# Patient Record
Sex: FEMALE | Race: WHITE | NOT HISPANIC OR LATINO | Employment: FULL TIME | URBAN - METROPOLITAN AREA
[De-identification: names, ages, dates, MRNs, and addresses within clinical notes are randomized per-mention and may not be internally consistent; named-entity substitution may affect disease eponyms.]

---

## 2017-02-23 ENCOUNTER — ALLSCRIPTS OFFICE VISIT (OUTPATIENT)
Dept: OTHER | Facility: OTHER | Age: 52
End: 2017-02-23

## 2017-07-06 ENCOUNTER — ALLSCRIPTS OFFICE VISIT (OUTPATIENT)
Dept: OTHER | Facility: OTHER | Age: 52
End: 2017-07-06

## 2017-08-18 ENCOUNTER — ALLSCRIPTS OFFICE VISIT (OUTPATIENT)
Dept: OTHER | Facility: OTHER | Age: 52
End: 2017-08-18

## 2017-08-18 ENCOUNTER — GENERIC CONVERSION - ENCOUNTER (OUTPATIENT)
Dept: OTHER | Facility: OTHER | Age: 52
End: 2017-08-18

## 2017-08-24 ENCOUNTER — GENERIC CONVERSION - ENCOUNTER (OUTPATIENT)
Dept: OTHER | Facility: OTHER | Age: 52
End: 2017-08-24

## 2017-08-24 LAB
ADEQUACY: (HISTORICAL): NORMAL
CLINICIAN PROVIDIED ICD 9 OR 10 (HISTORICAL): NORMAL
COMMENT (HISTORICAL): NORMAL
DIAGNOSIS (HISTORICAL): NORMAL
ELECTRONICALLY SIGNED BY (HISTORICAL): NORMAL
NOTE: (HISTORICAL): NORMAL
PERFORMED BY (HISTORICAL): NORMAL
REFLEX (HISTORICAL): NORMAL
TEST INFORMATION (HISTORICAL): NORMAL

## 2017-12-05 ENCOUNTER — ALLSCRIPTS OFFICE VISIT (OUTPATIENT)
Dept: OTHER | Facility: OTHER | Age: 52
End: 2017-12-05

## 2018-01-10 NOTE — PSYCH
Psych Med Mgmt    Appearance: was calm and cooperative  Observed mood: depressed and anxious  Observed mood: affect was blunted and affect was tearful  Speech: a normal rate  Thought processes: coherent/organized  Hallucinations: no hallucinations present  Thought Content: no delusions  Abnormal Thoughts: The patient has no suicidal thoughts and no homicidal thoughts  Orientation: The patient is oriented to person, place and time  Recent and Remote Memory: short term memory intact and long term memory intact  Attention Span And Concentration: concentration intact  Insight: Insight intact  Judgment: Her judgment was intact  Treatment Recommendations: continue with current medications and schedule appointment with a therapist      She reports normal appetite, normal energy level, no weight change and decrease in number of sleep hours   Patient continues to feel depressed and anxious  Since her last appointment in February, not much has changed in her life  She is still unemployed and struggling to find a job  Patient has been taking money out of her halfway fund to sustain herself, but she states that she is almost out, which has been causing her to feel overwhelmed  Patient's 25year old daughter is living with her and is actively using heroin, which adds to the patient feeling sad and panicked  Her daughter also has a child, which the patient takes a role in helping to raise  Her  passed away from cancer 2 years ago  Patient says that her sleeping pattern is variable, as she wakes up frequently in the middle of the night  She estimates she gets maybe 4-5 hours of fragmented sleep, and does not feel rested in the morning  She continues to have little energy, lack of motivation, and lack of interest       Assessment    1  Bipolar 1 disorder (296 7) (F31 9)   2  ADD (attention deficit disorder) (314 00) (F98 8)    Plan    1   Amphetamine-Dextroamphet ER 30 MG Oral Capsule Extended Release 24   Hour (Adderall XR); take 1 capsule daily   2  BuPROPion HCl ER (XL) 300 MG Oral Tablet Extended Release 24 Hour; 1 po   qam    3  LORazepam 1 MG Oral Tablet; take 1/2-1 tablet every 4 hours if needed   4  OXcarbazepine 600 MG Oral Tablet; 1 po qam  and 1 po qhs    Review of Systems    Constitutional: No fever, no chills, feels well, no tiredness, no recent weight gain or loss  Cardiovascular: no complaints of slow or fast heart rate, no chest pain, no palpitations  Respiratory: no complaints of shortness of breath, no wheezing, no dyspnea on exertion  Gastrointestinal: no complaints of abdominal pain, no constipation, no nausea, no diarrhea, no vomiting  Genitourinary: no complaints of dysuria, no incontinence, no pelvic pain, no urinary frequency  Musculoskeletal: no complaints of arthralgia, no myalgias, no limb pain, no joint stiffness  Integumentary: no complaints of skin rash, no itching, no dry skin  Neurological: no complaints of headache, no confusion, no numbness, no dizziness  Active Problems    1  ADD (attention deficit disorder) (314 00) (F98 8)   2  Bipolar 1 disorder (296 7) (F31 9)   3  Contraceptive use (V25 40) (Z30 40)   4  Encounter for routine gynecological examination with Papanicolaou smear of cervix   (V72 31,V76 2) (Z01 419)   5  Encounter for routine gynecological examination with Papanicolaou smear of cervix   (V72 31,V76 2) (Z01 419)   6  Gastritis (535 50) (K29 70)   7  GERD (gastroesophageal reflux disease) (530 81) (K21 9)   8  Herpes simplex infection (054 9) (B00 9)   9  Visit for screening mammogram (V76 12) (Z12 31)    Past Medical History    1  History of Bipolar disorder (296 80) (F31 9)   2  History of herpes zoster (V12 09) (Z86 19)   3  History of pregnancy (V13 29)    Allergies    1  Morphine Sulfate TABS    Current Meds   1  Amphetamine-Dextroamphet ER 30 MG Oral Capsule Extended Release 24 Hour; take 1   capsule daily;    Therapy: 88FDC7238 to (Evaluate:25Mar2017); Last Rx:23Feb2017 Ordered   2  BuPROPion HCl ER (XL) 300 MG Oral Tablet Extended Release 24 Hour; 1 po qam;   Therapy: 77WPD9070 to (Last Rx:23Nov2016)  Requested for: 23Nov2016 Ordered   3  Cyclobenzaprine HCl - 10 MG Oral Tablet; Therapy: 50Ber6646 to Recorded   4  Dexilant 60 MG Oral Capsule Delayed Release Recorded   5  Diclofenac Sodium 75 MG Oral Tablet Delayed Release; Therapy: 52LJB7828 to Recorded   6  Fluticasone Propionate 50 MCG/ACT Nasal Suspension; Therapy: 53YZX1520 to Recorded   7  Lo Loestrin Fe 1 MG-10 MCG / 10 MCG Oral Tablet; TAKE 1 TABLET DAILY AS   DIRECTED; Therapy: 34Ciq2646 to (Evaluate:17Jul2017)  Requested for: 15Zac1197; Last   Rx:22Jul2016 Ordered   8  LORazepam 1 MG Oral Tablet; take 1/2-1 tablet every 4 hours if needed; Therapy: 37UIF1592 to (Evaluate:98Pbr3948)  Requested for: 30Jun2017; Last   Rx:30Jun2017 Ordered   9  Metoprolol Succinate ER 25 MG Oral Tablet Extended Release 24 Hour; Therapy: 29QTS5247 to Recorded   10  OXcarbazepine 600 MG Oral Tablet; 1 po qam  and 1 po qhs;    Therapy: 47Bzr4571 to (Last Rx:13Apr2017)  Requested for: 13Apr2017 Ordered   11  Pramipexole Dihydrochloride 0 25 MG Oral Tablet; Therapy: 24UJX0440 to Recorded   12  ProAir  (90 Base) MCG/ACT Inhalation Aerosol Solution; Therapy: 84FSV9190 to Recorded   13  ValACYclovir HCl - 500 MG Oral Tablet; take 1 tablet twice a day; Therapy: 93UQX3580 to (Evaluate:71Czc9226)  Requested for: 55YJN7347; Last    Rx:17Feb2017 Ordered   14  Zolpidem Tartrate 10 MG Oral Tablet; TAKE 1/2 TO 1 TABLET AT BEDTIME; Therapy: 48NQI2054 to (Evaluate:14Jun2017)  Requested for: 74OWH2884; Last    Rx:16Mar2017 Ordered    Family Psych History  Mother    1  Family history of Blood disorder  Father    2   Family history of Hypertension    Social History    · Former smoker (D51 31) (A03 082)   · Occasional alcohol use   · Denied: History of Physical abuse   ·     End of Encounter Meds    1  Amphetamine-Dextroamphet ER 30 MG Oral Capsule Extended Release 24 Hour   (Adderall XR); take 1 capsule daily; Therapy: 61HBS5882 to (Evaluate:21Ogr7033); Last Rx:59Yms7251 Ordered   2  BuPROPion HCl ER (XL) 300 MG Oral Tablet Extended Release 24 Hour; 1 po qam;   Therapy: 57CZL0699 to (Last Rx:00Oso7026)  Requested for: 21ACI6963 Ordered    3  LORazepam 1 MG Oral Tablet; take 1/2-1 tablet every 4 hours if needed; Therapy: 68NQJ6419 to (Evaluate:05Aug2017)  Requested for: 01JYE6442; Last   Rx:54Uzr5529 Ordered   4  OXcarbazepine 600 MG Oral Tablet; 1 po qam  and 1 po qhs;   Therapy: 28Uza5065 to (Last Rx:66Eop6254)  Requested for: 82UXI9544 Ordered   5  Zolpidem Tartrate 10 MG Oral Tablet; TAKE 1/2 TO 1 TABLET AT BEDTIME; Therapy: 22RVW0567 to (Evaluate:14Jun2017)  Requested for: 30APH5352; Last   Rx:16Mar2017 Ordered    6  Lo Loestrin Fe 1 MG-10 MCG / 10 MCG Oral Tablet; TAKE 1 TABLET DAILY AS   DIRECTED; Therapy: 65Ftb0729 to (Evaluate:17Jul2017)  Requested for: 97Gxr1060; Last   Rx:12Ovx8920 Ordered    7  ValACYclovir HCl - 500 MG Oral Tablet (Valtrex); take 1 tablet twice a day; Therapy: 17OOH7726 to (Evaluate:82Yta3287)  Requested for: 51IVQ2184; Last   Rx:73Wwu5836 Ordered    8  Cyclobenzaprine HCl - 10 MG Oral Tablet; Therapy: 79Sob5473 to Recorded   9  Dexilant 60 MG Oral Capsule Delayed Release Recorded   10  Diclofenac Sodium 75 MG Oral Tablet Delayed Release; Therapy: 44FXK3178 to Recorded   11  Fluticasone Propionate 50 MCG/ACT Nasal Suspension; Therapy: 64MKW7554 to Recorded   12  Metoprolol Succinate ER 25 MG Oral Tablet Extended Release 24 Hour; Therapy: 20EZN2350 to Recorded   13  Pramipexole Dihydrochloride 0 25 MG Oral Tablet; Therapy: 17RYG4583 to Recorded   14  ProAir  (90 Base) MCG/ACT Inhalation Aerosol Solution;     Therapy: 52TXJ6857 to Recorded    Signatures   Electronically signed by : Flavio Fihs [facility]; Jul 6 2017  5:02PM EST                       (Author)    Electronically signed by : Mora Moody MD; Jul 6 2017  5:07PM EST

## 2018-01-11 NOTE — PSYCH
Psych Med Mgmt    Appearance: was calm and cooperative  Observed mood: was dysphoric, apathetic  and tearful  Observed mood: affect was constricted  Speech: a normal rate  Thought processes: coherent/organized  Hallucinations: no hallucinations present  Thought Content: no delusions  Abnormal Thoughts: The patient has no suicidal thoughts and no homicidal thoughts  Orientation: The patient is oriented to person, place and time  Recent and Remote Memory: short term memory intact and long term memory intact  Attention Span And Concentration: concentration intact  Insight: Insight intact  Judgment: Her judgment was intact  Muscle Strength And Tone  Normal gait and station  Treatment Recommendations: Increase Wellbutrin  mg po qd  Stop Prozac 10 mgpo qd  Trileptal 600 mg po bid  Adderall XR 30 mg po qd **RX GIVEN FOR 30 DAYS WITH 2 REFILLS***   Lorazepam 1 mg prn  Ambien 10 mg po qhs prn  Risks, Benefits And Possible Side Effects Of Medications: Risks, benefits, and possible side effects of medications explained to patient and patient verbalizes understanding  She reports normal appetite, normal energy level, no weight change and normal number of sleep hours  pt seen for follow up depression  She is feeling a little better  Her daughter had a baby boy  She is scheduled to have a sleep study the end of September  She had sleep study at home but going inpatient for one  Vitals  Signs   Recorded: 35Jlg8459 09:55AM   Respiration: 16  Height: 5 ft 5 5 in  Weight: 160 lb   BMI Calculated: 26 22  BSA Calculated: 1 81    DSM    Provisional Diagnosis: Bipolar 1  ADD  Assessment    1  Bipolar 1 disorder (296 7) (F31 9)   2  ADD (attention deficit disorder) (314 00) (F90 0)    Plan    1  Amphetamine-Dextroamphet ER 30 MG Oral Capsule Extended Release 24   Hour (Adderall XR); TAKE 1 CAPSULE DAILY   2   BuPROPion HCl ER (XL) 300 MG Oral Tablet Extended Release 24 Hour; 1 po   qam 3  LORazepam 1 MG Oral Tablet; 1/2-1 po q 4 hours prn   4  OXcarbazepine 600 MG Oral Tablet; 1 po qam  and 1 po qhs   5  Zolpidem Tartrate 10 MG Oral Tablet; take 1 tablet at  bedtime as needed for   insomnia    Review of Systems    Constitutional: No fever, no chills, feels well, no tiredness, no recent weight gain or loss  Cardiovascular: no complaints of slow or fast heart rate, no chest pain, no palpitations  Respiratory: no complaints of shortness of breath, no wheezing, no dyspnea on exertion  Gastrointestinal: no complaints of abdominal pain, no constipation, no nausea, no diarrhea, no vomiting  Genitourinary: no complaints of dysuria, no incontinence, no pelvic pain, no urinary frequency  Musculoskeletal: CHRONIC BACK PAIN, but no complaints of arthralgia, no myalgias, no limb pain, no joint stiffness  Integumentary: no complaints of skin rash, no itching, no dry skin  Active Problems    1  ADD (attention deficit disorder) (314 00) (F90 0)   2  Bipolar 1 disorder (296 7) (F31 9)   3  Contraceptive use (V25 40) (Z30 40)   4  Encounter for routine gynecological examination with Papanicolaou smear of cervix   (V72 31,V76 2) (Z01 419)   5  Encounter for routine gynecological examination with Papanicolaou smear of cervix   (V72 31,V76 2) (Z01 419)   6  Gastritis (535 50) (K29 70)   7  GERD (gastroesophageal reflux disease) (530 81) (K21 9)   8  Herpes simplex infection (054 9) (B00 9)   9  Visit for screening mammogram (V76 12) (Z12 31)    Past Medical History    1  History of Bipolar disorder (296 80) (F31 9)   2  History of herpes zoster (V12 09) (Z86 19)   3  History of pregnancy (V13 29)    Allergies    1  Morphine Sulfate TABS    Current Meds   1  Amphetamine-Dextroamphet ER 30 MG Oral Capsule Extended Release 24 Hour; TAKE   1 CAPSULE DAILY; Therapy: 25RHH1516 to (Evaluate:00Vtd8005); Last Rx:58Vah8989 Ordered   2   BuPROPion HCl ER (XL) 150 MG Oral Tablet Extended Release 24 Hour; 1 po qam;   Therapy: 30GVS0341 to (Last Rx:77Fdg3241)  Requested for: 30EEP9851 Ordered   3  Cyclobenzaprine HCl - 10 MG Oral Tablet; Therapy: 85Wcq2740 to Recorded   4  Dexilant 60 MG Oral Capsule Delayed Release Recorded   5  Diclofenac Sodium 75 MG Oral Tablet Delayed Release; Therapy: 88IJA0429 to Recorded   6  Lo Loestrin Fe 1 MG-10 MCG / 10 MCG Oral Tablet; TAKE 1 TABLET DAILY AS   DIRECTED; Therapy: 87Syc0922 to (Evaluate:98Mwf8862)  Requested for: 17Yil8167; Last   Rx:90Raf7474 Ordered   7  LORazepam 1 MG Oral Tablet; 1/2-1 po q 4 hours prn; Therapy: 41ITO5694 to (Last Rx:06Xfp4842) Ordered   8  Metoprolol Succinate ER 25 MG Oral Tablet Extended Release 24 Hour; Therapy: 27THQ3952 to Recorded   9  Ondansetron 4 MG Oral Tablet Dispersible; Therapy: 12FDD0361 to Recorded   10  OXcarbazepine 600 MG Oral Tablet; 1 po qhs;    Therapy: 12Ocw6734 to (Evaluate:71Xqa9018)  Requested for: 75GWJ5847; Last    Rx:32Ucm0292 Ordered   11  ProAir  (90 Base) MCG/ACT Inhalation Aerosol Solution; Therapy: 20ISD8480 to Recorded   12  ValACYclovir HCl - 500 MG Oral Tablet; Take 1 tablet twice daily; Therapy: 36BKJ2493 to (Evaluate:28Jan2017)  Requested for: 32TNG4637; Last    Rx:68Dtr5520 Ordered   13  Zolpidem Tartrate 10 MG Oral Tablet; take 1 tablet at  bedtime as needed for insomnia; Therapy: 72PSM8067 to (Evaluate:58Ams9437)  Requested for: 60AEM1489; Last    Rx:46Bnu9828 Ordered    The medication list was reviewed and updated today  Family Psych History  Mother    1  Family history of Blood disorder  Father    2  Family history of Hypertension    The family history was reviewed and updated today  Social History    · Former smoker (M49 22) (T33 949)   · Occasional alcohol use   · Denied: History of Physical abuse   ·   The social history was reviewed and updated today  The social history was reviewed and is unchanged  End of Encounter Meds    1   Amphetamine-Dextroamphet ER 30 MG Oral Capsule Extended Release 24 Hour   (Adderall XR); TAKE 1 CAPSULE DAILY; Therapy: 53EJW4214 to (Evaluate:26Nof1328); Last Rx:85Qfx3251 Ordered   2  BuPROPion HCl ER (XL) 300 MG Oral Tablet Extended Release 24 Hour; 1 po qam;   Therapy: 95PEN5590 to (Last Rx:07Lic5769)  Requested for: 59Uvc1576 Ordered    3  LORazepam 1 MG Oral Tablet; 1/2-1 po q 4 hours prn; Therapy: 62IGR7597 to (Last Rx:91Wcs6628) Ordered   4  OXcarbazepine 600 MG Oral Tablet; 1 po qam  and 1 po qhs;   Therapy: 59Dpg4718 to (Last Rx:07Hly5472)  Requested for: 54Ymd9289 Ordered   5  Zolpidem Tartrate 10 MG Oral Tablet; take 1 tablet at  bedtime as needed for insomnia; Therapy: 50AZS8205 to (Evaluate:14Oct2016)  Requested for: 35Nmj4339; Last   Rx:90Mrn8869 Ordered    6  Lo Loestrin Fe 1 MG-10 MCG / 10 MCG Oral Tablet; TAKE 1 TABLET DAILY AS   DIRECTED; Therapy: 24Btu0412 to (Evaluate:78Kfe5555)  Requested for: 02Dnb9467; Last   Rx:28Mfp7048 Ordered    7  ValACYclovir HCl - 500 MG Oral Tablet (Valtrex); Take 1 tablet twice daily; Therapy: 99CKQ1248 to (Evaluate:28Jan2017)  Requested for: 82ZZG9374; Last   Rx:71Wid8861 Ordered    8  Cyclobenzaprine HCl - 10 MG Oral Tablet; Therapy: 55Pbp2225 to Recorded   9  Dexilant 60 MG Oral Capsule Delayed Release Recorded   10  Diclofenac Sodium 75 MG Oral Tablet Delayed Release; Therapy: 00QZR2979 to Recorded   11  Metoprolol Succinate ER 25 MG Oral Tablet Extended Release 24 Hour; Therapy: 62LJU3413 to Recorded    Future Appointments    Date/Time Provider Specialty Site   11/23/2016 09:30 AM Doni Bl[facility]     Signatures   Electronically signed by : Tessie Bailey [facility]; Aug 15 2016  9:50AM EST                       (Author)    Electronically signed by : Tessie Bailey [facility];  Aug 15 2016  9:54AM EST                       (Author)    Electronically signed by : Miguel Rosales MD; Aug 15 2016  4:01PM EST

## 2018-01-13 NOTE — PSYCH
Psych Med Mgmt    Appearance: was calm and cooperative  Observed mood: mood appropriate  Observed mood: affect appropriate  Speech: a normal rate  Thought processes: coherent/organized  Hallucinations: no hallucinations present  Thought Content: no delusions  Abnormal Thoughts: The patient has no suicidal thoughts and no homicidal thoughts  Orientation: The patient is oriented to person, place and time  Recent and Remote Memory: short term memory intact and long term memory intact  Attention Span And Concentration: concentration intact  Insight: Insight intact  Judgment: Her judgment was intact  Muscle Strength And Tone  Normal gait and station  Treatment Recommendations: Wellbutrin  m po q am  Adderall XR 30 mg po qam  Trileptal 600 mg po bid  Ambien 10 mgo qhs prn  Ativan 1 mgpo q 4 hours  Trial Trazodone 50 mgpo qhs  Risks, Benefits And Possible Side Effects Of Medications: Risks, benefits, and possible side effects of medications explained to patient and patient verbalizes understanding  Discussed with patient the risks of sedation, respiratory depression, impairment of ability to drive and potential for abuse and addiction related to treatment with benzodiazepine medications  The patient understands risk of treatment with benzodiazepine medications, agrees to not drive if feels impaired and agrees to take medications as prescribed  She reports normal appetite, normal energy level, no weight change and normal number of sleep hours  Pt seen for follow up ADD/ Bipolar Disorder  Pt states she has continues to struggle with depression/ motivation  Her daughter is going to rehab tomorrow- this will be her 4th time in rehab  She is caring for the 2 month old baby  She did have sleep study and was diagnosed with restless syndrome and started on requip, nasal spray and was told to use a mouth guard  Pt overall appears at her baseline   She has not been having any side effects with meds  She continues with difficulty with sleep  Appetite is good  Vitals  Signs   Recorded: 23Nov2016 09:59AM   Heart Rate: 83  Respiration: 16  Systolic: 898  Diastolic: 98  Height: 5 ft 5 5 in  Weight: 159 lb   BMI Calculated: 26 06  BSA Calculated: 1 8    DSM    Provisional Diagnosis: ADD  Bipolar DIsorder  Assessment    1  ADD (attention deficit disorder) (314 00) (F98 8)   2  Bipolar 1 disorder (296 7) (F31 9)    Plan    1  TraZODone HCl - 50 MG Oral Tablet; 1 -2 po qhs prn   2  Amphetamine-Dextroamphet ER 30 MG Oral Capsule Extended Release 24   Hour (Adderall XR); take 1 capsule daily   3  BuPROPion HCl ER (XL) 300 MG Oral Tablet Extended Release 24 Hour; 1 po   qam    4  OXcarbazepine 600 MG Oral Tablet; 1 po qam  and 1 po qhs    5  Ondansetron 4 MG Oral Tablet Dispersible    Review of Systems    Constitutional: feeling tired  Cardiovascular: no complaints of slow or fast heart rate, no chest pain, no palpitations  Respiratory: no complaints of shortness of breath, no wheezing, no dyspnea on exertion  Gastrointestinal: no complaints of abdominal pain, no constipation, no nausea, no diarrhea, no vomiting  Genitourinary: no complaints of dysuria, no incontinence, no pelvic pain, no urinary frequency  Musculoskeletal: no complaints of arthralgia, no myalgias, no limb pain, no joint stiffness  Integumentary: no complaints of skin rash, no itching, no dry skin  Neurological: no complaints of headache, no confusion, no numbness, no dizziness  Active Problems    1  ADD (attention deficit disorder) (314 00) (F98 8)   2  Bipolar 1 disorder (296 7) (F31 9)   3  Contraceptive use (V25 40) (Z30 40)   4  Encounter for routine gynecological examination with Papanicolaou smear of cervix   (V72 31,V76 2) (Z01 419)   5  Encounter for routine gynecological examination with Papanicolaou smear of cervix   (V72 31,V76 2) (Z01 419)   6  Gastritis (535 50) (K29 70)   7  GERD (gastroesophageal reflux disease) (530 81) (K21 9)   8  Herpes simplex infection (054 9) (B00 9)   9  Visit for screening mammogram (V76 12) (Z12 31)    Past Medical History    1  History of Bipolar disorder (296 80) (F31 9)   2  History of herpes zoster (V12 09) (Z86 19)   3  History of pregnancy (V13 29)    Allergies    1  Morphine Sulfate TABS    Current Meds   1  Amphetamine-Dextroamphet ER 30 MG Oral Capsule Extended Release 24 Hour; TAKE   1 CAPSULE DAILY; Therapy: 21QBW2196 to (Evaluate:98Oro6094); Last Rx:63Xnw1758 Ordered   2  BuPROPion HCl ER (XL) 300 MG Oral Tablet Extended Release 24 Hour; 1 po qam;   Therapy: 23JXA9449 to (Last Rx:41Qhz5754)  Requested for: 36Djm2156 Ordered   3  Cyclobenzaprine HCl - 10 MG Oral Tablet; Therapy: 62Xls5944 to Recorded   4  Dexilant 60 MG Oral Capsule Delayed Release Recorded   5  Diclofenac Sodium 75 MG Oral Tablet Delayed Release; Therapy: 46CLZ2252 to Recorded   6  Fluticasone Propionate 50 MCG/ACT Nasal Suspension; Therapy: 03SDQ0145 to Recorded   7  Lo Loestrin Fe 1 MG-10 MCG / 10 MCG Oral Tablet; TAKE 1 TABLET DAILY AS   DIRECTED; Therapy: 38Asf4871 to (Evaluate:00Pja6632)  Requested for: 92Twk1750; Last   Rx:76Wya7092 Ordered   8  LORazepam 1 MG Oral Tablet; 1/2-1 po q 4 hours prn; Therapy: 66HOX7800 to (Last Rx:69Rat3769)  Requested for: 18Eao6943; Status:   ACTIVE - Renewal Denied Ordered   9  Metoprolol Succinate ER 25 MG Oral Tablet Extended Release 24 Hour; Therapy: 03FMV8745 to Recorded   10  Ondansetron 4 MG Oral Tablet Dispersible; Therapy: 65QTC3164 to Recorded   11  OXcarbazepine 600 MG Oral Tablet; 1 po qam  and 1 po qhs;    Therapy: 01Ogt8986 to (Last Rx:17Hpv0244)  Requested for: 45Qld3001 Ordered   12  Pramipexole Dihydrochloride 0 25 MG Oral Tablet; Therapy: 38LQD5360 to Recorded   13  ProAir  (90 Base) MCG/ACT Inhalation Aerosol Solution; Therapy: 84JRP5985 to Recorded   14   ValACYclovir HCl - 500 MG Oral Tablet; Take 1 tablet twice daily; Therapy: 66UOJ8548 to (Evaluate:28Jan2017)  Requested for: 85SYW5969; Last    Rx:54Pkr0855 Ordered   15  Zolpidem Tartrate 10 MG Oral Tablet; take 1 tablet by mouth at bedtime for sleep; Therapy: 66WRL7832 to (Evaluate:28Owf6293)  Requested for: 15TCZ6545; Last    Rx:16Nov2016 Ordered    The medication list was reviewed and updated today  Family Psych History  Mother    1  Family history of Blood disorder  Father    2  Family history of Hypertension    The family history was reviewed and updated today  Social History    · Former smoker (Y61 87) (K14 564)   · Occasional alcohol use   · Denied: History of Physical abuse   ·   The social history was reviewed and updated today  The social history was reviewed and is unchanged  End of Encounter Meds    1  Amphetamine-Dextroamphet ER 30 MG Oral Capsule Extended Release 24 Hour   (Adderall XR); take 1 capsule daily; Therapy: 35BVC3690 to (Evaluate:23Dec2016); Last Rx:23Nov2016 Ordered   2  BuPROPion HCl ER (XL) 300 MG Oral Tablet Extended Release 24 Hour; 1 po qam;   Therapy: 80TNW1462 to (Last Rx:23Nov2016)  Requested for: 23Nov2016 Ordered   3  TraZODone HCl - 50 MG Oral Tablet; 1 -2 po qhs prn; Therapy: 91CQF8864 to (Last Rx:23Nov2016)  Requested for: 23Nov2016 Ordered    4  LORazepam 1 MG Oral Tablet; 1/2-1 po q 4 hours prn; Therapy: 48TEI4602 to (Last Rx:40Rbg4831)  Requested for: 96Bdz0075; Status:   ACTIVE - Renewal Denied Ordered   5  OXcarbazepine 600 MG Oral Tablet; 1 po qam  and 1 po qhs;   Therapy: 52Fyc8018 to (Last Rx:23Nov2016)  Requested for: 23Nov2016 Ordered   6  Zolpidem Tartrate 10 MG Oral Tablet; take 1 tablet by mouth at bedtime for sleep; Therapy: 16AIK8381 to (Evaluate:79Tuw3610)  Requested for: 41FIS6548; Last   Rx:16Nov2016 Ordered    7  Lo Loestrin Fe 1 MG-10 MCG / 10 MCG Oral Tablet; TAKE 1 TABLET DAILY AS   DIRECTED;    Therapy: 90Pok7046 to (Evaluate:23Yxd1397)  Requested for: 27YEV2755; Last   Rx:05Puv2930 Ordered    8  ValACYclovir HCl - 500 MG Oral Tablet (Valtrex); Take 1 tablet twice daily; Therapy: 53WHP2153 to (Evaluate:28Jan2017)  Requested for: 14UPD8147; Last   Rx:31Ird0288 Ordered    9  Cyclobenzaprine HCl - 10 MG Oral Tablet; Therapy: 48Scm3479 to Recorded   10  Dexilant 60 MG Oral Capsule Delayed Release Recorded   11  Diclofenac Sodium 75 MG Oral Tablet Delayed Release; Therapy: 43ZFS5183 to Recorded   12  Fluticasone Propionate 50 MCG/ACT Nasal Suspension; Therapy: 02QNC0736 to Recorded   13  Metoprolol Succinate ER 25 MG Oral Tablet Extended Release 24 Hour; Therapy: 02PII5358 to Recorded   14  Pramipexole Dihydrochloride 0 25 MG Oral Tablet; Therapy: 29FQH7551 to Recorded   15  ProAir  (90 Base) MCG/ACT Inhalation Aerosol Solution;     Therapy: 38KLK3721 to Recorded    Signatures   Electronically signed by : Emily Barber [facility]; Nov 23 2016 10:11AM EST                       (Author)    Electronically signed by : Aranza Bay MD; Nov 23 2016  3:38PM EST

## 2018-01-14 VITALS
SYSTOLIC BLOOD PRESSURE: 120 MMHG | DIASTOLIC BLOOD PRESSURE: 78 MMHG | HEIGHT: 65 IN | WEIGHT: 164.38 LBS | BODY MASS INDEX: 27.39 KG/M2

## 2018-01-16 NOTE — PSYCH
Psych Med Mgmt    Appearance: was calm and cooperative  Observed mood: was dysphoric, depressed and anxious  Observed mood: affect was constricted and affect was tearful  Speech: a normal rate  Thought processes: coherent/organized  Hallucinations: no hallucinations present  Thought Content: no delusions  Abnormal Thoughts: The patient has no suicidal thoughts and no homicidal thoughts  Orientation: The patient is oriented to person, place and time  Recent and Remote Memory: short term memory intact and long term memory intact  Attention Span And Concentration: concentration intact  Insight: Insight intact  Judgment: Her judgment was intact  Muscle Strength And Tone  Normal gait and station  Treatment Recommendations: Wellbutrin  mg po qam  Trial Vraylar 1 5 mg po qd  Ambien 10 mgpo qhs- does not take every night  Trileptal 600 mgpo bid  Adderall XR 30 mgpo qam  Ativan prn- not taking often    She has had multiple med trials in past    Risks, Benefits And Possible Side Effects Of Medications: Risks, benefits, and possible side effects of medications explained to patient and patient verbalizes understanding  She reports normal appetite, normal energy level, no weight change and normal number of sleep hours  Pt seen for follow up Bipolar Disorder/ ADD  Pt continues with residual stressors/ depression- poor motivation and "not doing anything"  SHe states she is not working and is looking for work but has not found anythng- worked in [address]  She states she is hart at times  No suicidal thoughts  Poor motivation- lack of interest  Sleep variable- states trazodone was "too strong"  No new medical issues  Her daughter is out of rehab x one week- baby 7 months old now,        Vitals  Signs   Recorded: 45Uym8407 10:06AM   Heart Rate: 74  Systolic: 544  Diastolic: 91  Recorded: 14RTD0869 10:00AM   Respiration: 16  Height: 5 ft 5 5 in  Weight: 162 lb   BMI Calculated: 26 55  BSA Calculated: 1 82    DSM    Provisional Diagnosis: ADD  Bipolar Disorder  Assessment    1  ADD (attention deficit disorder) (314 00) (F98 8)   2  Bipolar 1 disorder (296 7) (F31 9)    Plan    1  Amphetamine-Dextroamphet ER 30 MG Oral Capsule Extended Release 24   Hour (Adderall XR); take 1 capsule daily    2  Vraylar 1 5 MG Oral Capsule; 1 PO QD    Review of Systems    Constitutional: No fever, no chills, feels well, no tiredness, no recent weight gain or loss  Cardiovascular: no complaints of slow or fast heart rate, no chest pain, no palpitations  Respiratory: no complaints of shortness of breath, no wheezing, no dyspnea on exertion  Gastrointestinal: no complaints of abdominal pain, no constipation, no nausea, no diarrhea, no vomiting  Genitourinary: no complaints of dysuria, no incontinence, no pelvic pain, no urinary frequency  Musculoskeletal: no complaints of arthralgia, no myalgias, no limb pain, no joint stiffness  Integumentary: no complaints of skin rash, no itching, no dry skin  Neurological: no complaints of headache, no confusion, no numbness, no dizziness  Active Problems    1  ADD (attention deficit disorder) (314 00) (F98 8)   2  Bipolar 1 disorder (296 7) (F31 9)   3  Contraceptive use (V25 40) (Z30 40)   4  Encounter for routine gynecological examination with Papanicolaou smear of cervix   (V72 31,V76 2) (Z01 419)   5  Encounter for routine gynecological examination with Papanicolaou smear of cervix   (V72 31,V76 2) (Z01 419)   6  Gastritis (535 50) (K29 70)   7  GERD (gastroesophageal reflux disease) (530 81) (K21 9)   8  Herpes simplex infection (054 9) (B00 9)   9  Visit for screening mammogram (V76 12) (Z12 31)    Past Medical History    1  History of Bipolar disorder (296 80) (F31 9)   2  History of herpes zoster (V12 09) (Z86 19)   3  History of pregnancy (V13 29)    Allergies    1  Morphine Sulfate TABS    Current Meds   1   Amphetamine-Dextroamphet ER 30 MG Oral Capsule Extended Release 24 Hour; take 1   capsule daily; Therapy: 69ZCZ6682 to (Evaluate:25Hvg4324); Last Rx:23Nov2016 Ordered   2  BuPROPion HCl ER (XL) 300 MG Oral Tablet Extended Release 24 Hour; 1 po qam;   Therapy: 03QOD8392 to (Last Rx:23Nov2016)  Requested for: 23Nov2016 Ordered   3  Cyclobenzaprine HCl - 10 MG Oral Tablet; Therapy: 78Gdi9707 to Recorded   4  Dexilant 60 MG Oral Capsule Delayed Release Recorded   5  Diclofenac Sodium 75 MG Oral Tablet Delayed Release; Therapy: 38ZUO5388 to Recorded   6  Fluticasone Propionate 50 MCG/ACT Nasal Suspension; Therapy: 64SEL5464 to Recorded   7  Lo Loestrin Fe 1 MG-10 MCG / 10 MCG Oral Tablet; TAKE 1 TABLET DAILY AS   DIRECTED; Therapy: 78Xel0800 to (Evaluate:87Qiy7425)  Requested for: 34Fct6619; Last   Rx:10Drd5051 Ordered   8  LORazepam 1 MG Oral Tablet; take 1/2-1 tablet every 4 hours if needed; Therapy: 16ONL2337 to (Evaluate:10Coh4676)  Requested for: 70DCD5670; Last   Rx:11Jan2017 Ordered   9  Metoprolol Succinate ER 25 MG Oral Tablet Extended Release 24 Hour; Therapy: 10FKF8602 to Recorded   10  OXcarbazepine 600 MG Oral Tablet; 1 po qam  and 1 po qhs;    Therapy: 17Flc3386 to (Last Rx:23Nov2016)  Requested for: 23Nov2016 Ordered   11  Pramipexole Dihydrochloride 0 25 MG Oral Tablet; Therapy: 34TKY3478 to Recorded   12  ProAir  (90 Base) MCG/ACT Inhalation Aerosol Solution; Therapy: 66TDW0871 to Recorded   13  ValACYclovir HCl - 500 MG Oral Tablet; take 1 tablet twice a day; Therapy: 05CFZ2577 to (Evaluate:15Fxm1445)  Requested for: 03GHU0594; Last    Rx:17Feb2017 Ordered   14  Zolpidem Tartrate 10 MG Oral Tablet; take 1 tablet by mouth at bedtime for sleep; Therapy: 68FCH4028 to (Evaluate:11Ulx8807)  Requested for: 82JDH9324; Last    Rx:11Jan2017; Status: ACTIVE - Renewal Denied Ordered    The medication list was reviewed and updated today  Family Psych History  Mother    1   Family history of Blood disorder  Father    2  Family history of Hypertension    Social History    · Former smoker (C91 74) (Y83 293)   · Occasional alcohol use   · Denied: History of Physical abuse   ·     End of Encounter Meds    1  Amphetamine-Dextroamphet ER 30 MG Oral Capsule Extended Release 24 Hour   (Adderall XR); take 1 capsule daily; Therapy: 24TRF5029 to (Evaluate:25Mar2017); Last Rx:23Feb2017 Ordered   2  BuPROPion HCl ER (XL) 300 MG Oral Tablet Extended Release 24 Hour; 1 po qam;   Therapy: 07WLY7873 to (Last Rx:23Nov2016)  Requested for: 23Nov2016 Ordered    3  LORazepam 1 MG Oral Tablet; take 1/2-1 tablet every 4 hours if needed; Therapy: 10ESR0149 to (Evaluate:10Feb2017)  Requested for: 13RLT6208; Last   Rx:11Jan2017 Ordered   4  OXcarbazepine 600 MG Oral Tablet; 1 po qam  and 1 po qhs;   Therapy: 42Xlo3355 to (Last Rx:23Nov2016)  Requested for: 23Nov2016 Ordered   5  Vraylar 1 5 MG Oral Capsule; 1 PO QD; Therapy: 11HOA1595 to (Last Rx:23Feb2017) Ordered   6  Zolpidem Tartrate 10 MG Oral Tablet; take 1 tablet by mouth at bedtime for sleep; Therapy: 12OWF2971 to (Evaluate:10Feb2017)  Requested for: 66KQF0984; Last   Rx:11Jan2017; Status: ACTIVE - Renewal Denied Ordered    7  Lo Loestrin Fe 1 MG-10 MCG / 10 MCG Oral Tablet; TAKE 1 TABLET DAILY AS   DIRECTED; Therapy: 29Zhy4505 to (Evaluate:39Cxa3431)  Requested for: 46Lga3964; Last   Rx:04Kmp3338 Ordered    8  ValACYclovir HCl - 500 MG Oral Tablet (Valtrex); take 1 tablet twice a day; Therapy: 36YXQ4282 to (Evaluate:30Oum7055)  Requested for: 53LBN6553; Last   Rx:17Feb2017 Ordered    9  Cyclobenzaprine HCl - 10 MG Oral Tablet; Therapy: 61Fyd8131 to Recorded   10  Dexilant 60 MG Oral Capsule Delayed Release Recorded   11  Diclofenac Sodium 75 MG Oral Tablet Delayed Release; Therapy: 27CUS1471 to Recorded   12  Fluticasone Propionate 50 MCG/ACT Nasal Suspension; Therapy: 57FMR9375 to Recorded   13   Metoprolol Succinate ER 25 MG Oral Tablet Extended Release 24 Hour; Therapy: 49OUF8763 to Recorded   14  Pramipexole Dihydrochloride 0 25 MG Oral Tablet; Therapy: 01XEC7835 to Recorded   15  ProAir  (90 Base) MCG/ACT Inhalation Aerosol Solution;     Therapy: 05AMO6045 to Recorded    Signatures   Electronically signed by : Leah Johnson, [facility]; Feb 23 2017 10:10AM EST                       (Author)    Electronically signed by : Miriam Villegas MD; Feb 27 2017  4:26PM EST

## 2018-01-17 NOTE — PSYCH
Psych Med Mgmt    Observed mood: was dysphoric and depressed  Observed mood: affect was tearful  Speech: speech soft  Thought processes: coherent/organized  Hallucinations: no hallucinations present  Thought Content: no delusions  Abnormal Thoughts: The patient has no suicidal thoughts and no homicidal thoughts  Orientation: The patient is oriented to person, place and time  Recent and Remote Memory: short term memory intact and long term memory intact  Attention Span And Concentration: concentration intact  Insight: Insight intact  Judgment: Her judgment was intact  Muscle Strength And Tone  Normal gait and station  Treatment Recommendations: Adderal XR 30 mgpo qd **RX GIVEN FOR 30 DAYS WITH 1 REFILL***  Increase Trileptal 300 mg 1 po qam and 2 po qhs  Decrease Prozac to 10 mg po qd  Wellbutrin  mg po qam  Ambien 10 mg po qhs prn  Ativan 1mg po q4 hours prn  Risks, Benefits And Possible Side Effects Of Medications: Risks, benefits, and possible side effects of medications explained to patient and patient verbalizes understanding  She reports normal appetite, normal energy level, no weight change and normal number of sleep hours  Pt seen for follow up Bipolar Disorder/ADD  Pt did not feel "good" with Cymbalta - she took for 2 months and stopped and went back to Prozac  She continues with crying spells/ tearfulness  She feels tired often- no anxiety or panic attacks  No motivation or interest  No SI       DSM    Provisional Diagnosis: ADD  Bipolar Disorder  Assessment    1  ADD (attention deficit disorder) (314 00) (F90 0)   2  Bipolar 1 disorder (296 7) (F31 9)    Plan    1  Amphetamine-Dextroamphet ER 30 MG Oral Capsule Extended Release 24 Hour   (Adderall XR); TAKE 1 CAPSULE DAILY   2  BuPROPion HCl ER (XL) 150 MG Oral Tablet Extended Release 24 Hour   (Wellbutrin XL); 1 po qam   3  FLUoxetine HCl - 10 MG Oral Tablet; 1 PO QD    4   LORazepam 1 MG Oral Tablet; 1/2-1 po q 4 hours prn   5  OXcarbazepine 300 MG Oral Tablet; 1 tab po qam and 2 po qhs   6  Zolpidem Tartrate 10 MG Oral Tablet; take 1 tablet at  bedtime as needed for   insomnia    Review of Systems    Constitutional: No fever, no chills, feels well, no tiredness, no recent weight gain or loss  Cardiovascular: no complaints of slow or fast heart rate, no chest pain, no palpitations  Respiratory: no complaints of shortness of breath, no wheezing, no dyspnea on exertion  Gastrointestinal: no complaints of abdominal pain, no constipation, no nausea, no diarrhea, no vomiting  Genitourinary: no complaints of dysuria, no incontinence, no pelvic pain, no urinary frequency  Musculoskeletal: back/ neck pain  Integumentary: no complaints of skin rash, no itching, no dry skin  Neurological: headache  Active Problems    1  ADD (attention deficit disorder) (314 00) (F90 0)   2  Bipolar 1 disorder (296 7) (F31 9)   3  Encounter for routine gynecological examination with Papanicolaou smear of cervix   (V72 31,V76 2) (Z01 419)   4  Encounter for routine gynecological examination with Papanicolaou smear of cervix   (V72 31,V76 2) (Z01 419)   5  Gastritis (535 50) (K29 70)   6  GERD (gastroesophageal reflux disease) (530 81) (K21 9)   7  Herpes simplex infection (054 9) (B00 9)   8  Visit for screening mammogram (V76 12) (Z12 31)    Past Medical History    1  History of Bipolar disorder (296 80) (F31 9)   2  History of herpes zoster (V12 09) (Z86 19)   3  History of pregnancy (V13 29)    Allergies    1  Morphine Sulfate TABS    Current Meds   1  Cyclobenzaprine HCl - 10 MG Oral Tablet; Therapy: 77Ueu1233 to Recorded   2  Dexilant 60 MG Oral Capsule Delayed Release Recorded   3  Diclofenac Sodium 75 MG Oral Tablet Delayed Release; Therapy: 53YWK0358 to Recorded   4  LORazepam 1 MG Oral Tablet; 1/2-1 po q 4 hours prn; Therapy: 99OXZ3885 to (Last Rx:11Mar2016) Ordered   5   Metoprolol Succinate ER 25 MG Oral Tablet Extended Release 24 Hour; Therapy: 15LHR0027 to Recorded   6  OXcarbazepine 300 MG Oral Tablet; 1 tab po bid; Therapy: 41Rox1417 to (Evaluate:09Jun2016)  Requested for: 30XQI6941; Last   Rx:11Mar2016 Ordered   7  ValACYclovir HCl - 500 MG Oral Tablet; Take 1 tablet twice daily; Therapy: 08YZA6111 to (Evaluate:28Jan2017)  Requested for: 49CAW1123; Last   Rx:78Pny8706 Ordered   8  Zolpidem Tartrate 10 MG Oral Tablet; take 1 tablet at  bedtime as needed for insomnia; Therapy: 86ZAF8329 to (Evaluate:10Iug2970)  Requested for: 20Jun2016; Last   Rx:20Jun2016 Ordered    The medication list was reviewed and updated today  Family Psych History  Mother    1  Family history of Blood disorder  Father    2  Family history of Hypertension    The family history was reviewed and updated today  Social History    · Former smoker (H71 64) (M86 576)   · Occasional alcohol use   · Denied: History of Physical abuse   ·   The social history was reviewed and updated today  The social history was reviewed and is unchanged  End of Encounter Meds    1  Amphetamine-Dextroamphet ER 30 MG Oral Capsule Extended Release 24 Hour   (Adderall XR); TAKE 1 CAPSULE DAILY; Therapy: 78VWN6291 to (Evaluate:72Szg7315); Last Rx:47Mcy2405 Ordered   2  BuPROPion HCl ER (XL) 150 MG Oral Tablet Extended Release 24 Hour (Wellbutrin XL);   1 po qam;   Therapy: 69OWY0038 to (Last Rx:74Nqf4174)  Requested for: 17IJD8533 Ordered   3  FLUoxetine HCl - 10 MG Oral Tablet; 1 PO QD; Therapy: 79LUA1201 to (Last Rx:10Ack8453)  Requested for: 95EEW9212 Ordered    4  LORazepam 1 MG Oral Tablet; 1/2-1 po q 4 hours prn; Therapy: 67XOB1297 to (Last Rx:71Xuw4211) Ordered   5  OXcarbazepine 300 MG Oral Tablet; 1 tab po qam and 2 po qhs;   Therapy: 37Pff2501 to (Evaluate:59Xcu2666)  Requested for: 49AGT8439; Last   Rx:63Bic1893 Ordered   6  Zolpidem Tartrate 10 MG Oral Tablet; take 1 tablet at  bedtime as needed for insomnia;    Therapy: 96PCX9829 to (Evaluate:57Tda1805)  Requested for: 38ZEO2033; Last   Rx:65Mrx5497 Ordered    7  ValACYclovir HCl - 500 MG Oral Tablet (Valtrex); Take 1 tablet twice daily; Therapy: 58RTH9239 to (Evaluate:28Jan2017)  Requested for: 89SAG9346; Last   Rx:43Smx4725 Ordered    8  Cyclobenzaprine HCl - 10 MG Oral Tablet; Therapy: 26Ynw7869 to Recorded   9  Dexilant 60 MG Oral Capsule Delayed Release Recorded   10  Diclofenac Sodium 75 MG Oral Tablet Delayed Release; Therapy: 29JNW9401 to Recorded   11  Metoprolol Succinate ER 25 MG Oral Tablet Extended Release 24 Hour;     Therapy: 24WAT0956 to Recorded    Signatures   Electronically signed by : Savi Villareal, [facility]; Jul 14 2016 11:29AM EST                       (Author)    Electronically signed by : Lawanda Mckenzie MD; Jul 18 2016  5:44PM EST

## 2018-01-22 VITALS
SYSTOLIC BLOOD PRESSURE: 134 MMHG | HEIGHT: 66 IN | DIASTOLIC BLOOD PRESSURE: 91 MMHG | HEART RATE: 74 BPM | WEIGHT: 162 LBS | RESPIRATION RATE: 16 BRPM | BODY MASS INDEX: 26.03 KG/M2

## 2018-01-23 NOTE — PSYCH
Psych Med Mgmt    Appearance: was calm and cooperative  Observed mood: mood appropriate  Observed mood: affect appropriate  Speech: a normal rate  Thought processes: coherent/organized  Hallucinations: no hallucinations present  Thought Content: no delusions  Abnormal Thoughts: The patient has no suicidal thoughts and no homicidal thoughts  Orientation: The patient is oriented to person, place and time  Recent and Remote Memory: short term memory intact and long term memory intact  Attention Span And Concentration: concentration intact  Insight: Insight intact  Judgment: Her judgment was intact  Muscle Strength And Tone  Normal gait and station  Treatment Recommendations: Adderall XR 30 ng po qam  Trileptal 600 mg po bid   Restart Wellbutrin Xl 150 qam and then increase o 300 after 7 days  Ambien 10 mg po qhs prn  Ativan 1 mg po prn  Risks, Benefits And Possible Side Effects Of Medications: Risks, benefits, and possible side effects of medications explained to patient and patient verbalizes understanding  Discussed with patient the risks of sedation, respiratory depression, impairment of ability to drive and potential for abuse and addiction related to treatment with benzodiazepine medications  The patient understands risk of treatment with benzodiazepine medications, agrees to not drive if feels impaired and agrees to take medications as prescribed  She reports normal appetite, normal energy level, no weight change and normal number of sleep hours  Pt states she has been "okay"  She reports stable moods  Pt has been working at [employer] and is feeling better about that since she is working  She is sleeping okay- alternates between ativan and ambien at night  She states her daughter and 13 month old grandson live with her  This is stressful but she is handling things  Appetite is good  Fair motivation   She states she continues to feel tired a lot during the day but this is her baseline  Had sleep study which did not show apnea  Overall doing okay but stopped wellbutrin due to cost and did not have insurance until this month  DSM    Provisional Diagnosis: Bipolar Disorder  Assessment    1  ADD (attention deficit disorder) (314 00) (F98 8)   2  Bipolar 1 disorder (296 7) (F31 9)    Plan    1  Amphetamine-Dextroamphet ER 30 MG Oral Capsule Extended Release 24   Hour (Adderall XR); take 1 capsule daily   2  BuPROPion HCl ER (XL) 150 MG Oral Tablet Extended Release 24 Hour; 1 po   qam    3  LORazepam 1 MG Oral Tablet; take 1/2-1 tablet every 4 hours if needed   4  OXcarbazepine 600 MG Oral Tablet; 1 po qam  and 1 po qhs   5  Zolpidem Tartrate 10 MG Oral Tablet; TAKE 1/2-1 TABLET BY MOUTH AT   BEDTIME AS NEEDED    Review of Systems    Constitutional: No fever, no chills, feels well, no tiredness, no recent weight gain or loss  Cardiovascular: no complaints of slow or fast heart rate, no chest pain, no palpitations  Respiratory: no complaints of shortness of breath, no wheezing, no dyspnea on exertion  Gastrointestinal: no complaints of abdominal pain, no constipation, no nausea, no diarrhea, no vomiting  Genitourinary: no complaints of dysuria, no incontinence, no pelvic pain, no urinary frequency  Musculoskeletal: no complaints of arthralgia, no myalgias, no limb pain, no joint stiffness  Integumentary: no complaints of skin rash, no itching, no dry skin  Neurological: no complaints of headache, no confusion, no numbness, no dizziness  Active Problems    1  ADD (attention deficit disorder) (314 00) (F98 8)   2  Bipolar 1 disorder (296 7) (F31 9)   3  Contraceptive use (V25 40) (Z30 40)   4  Encounter for routine gynecological examination with Papanicolaou smear of cervix   (V72 31,V76 2) (Z01 419)   5  Encounter for routine gynecological examination with Papanicolaou smear of cervix   (V72 31,V76 2) (Z01 419)   6   Gastritis (535 50) (K29 70)   7  GERD (gastroesophageal reflux disease) (530 81) (K21 9)   8  Herpes simplex infection (054 9) (B00 9)   9  Visit for screening mammogram (V76 12) (Z12 31)    Past Medical History    1  History of Bipolar disorder (296 80) (F31 9)   2  History of herpes zoster (V12 09) (Z86 19)   3  History of pregnancy (V13 29)    Allergies    1  Morphine Sulfate TABS    Current Meds   1  Amphetamine-Dextroamphet ER 30 MG Oral Capsule Extended Release 24 Hour; take 1   capsule daily; Therapy: 76SQR2882 to (Evaluate:14Czt4835); Last Rx:83Lsa4728 Ordered   2  BuPROPion HCl ER (XL) 300 MG Oral Tablet Extended Release 24 Hour; 1 po qam;   Therapy: 40EUM3305 to (Last Rx:91Vvd9052)  Requested for: 20JOE4939 Ordered   3  Dexilant 60 MG Oral Capsule Delayed Release Recorded   4  Fluticasone Propionate 50 MCG/ACT Nasal Suspension; Therapy: 86CPX1551 to Recorded   5  LORazepam 1 MG Oral Tablet; take 1/2-1 tablet every 4 hours if needed; Therapy: 83APA8314 to (Autumn Gonsales)  Requested for: 78PVA7487; Last   Rx:94Jgr4619 Ordered   6  Metoprolol Succinate ER 25 MG Oral Tablet Extended Release 24 Hour; Therapy: 67AQA8291 to Recorded   7  Ortho Tri-Cyclen Lo 0 18/0 215/0 25 MG-25 MCG Oral Tablet; Take 1 tablet daily; Therapy: 60KJS7669 to (Last Rx:24Vej8417)  Requested for: 57Drf7127 Ordered   8  OXcarbazepine 600 MG Oral Tablet; 1 po qam  and 1 po qhs;   Therapy: 64Axl0129 to (Last Rx:52Gpe7979)  Requested for: 60Ama3868 Ordered   9  ProAir  (90 Base) MCG/ACT Inhalation Aerosol Solution; Therapy: 18YHI4850 to Recorded   10  ValACYclovir HCl - 500 MG Oral Tablet; take 1 tablet twice a day; Therapy: 96BTA8390 to (Evaluate:04Lrr8276)  Requested for: 38EXM7121; Last    Rx:30Edt1479 Ordered   11  Zolpidem Tartrate 10 MG Oral Tablet; TAKE 1/2-1 TABLET BY MOUTH AT BEDTIME AS    NEEDED;     Therapy: 59YZH6333 to (Autumn Gonsales)  Requested for: 66GFJ6671; Last    Rx:[id number] Ordered    The medication list was reviewed and updated today  Family Psych History  Mother    1  Family history of Blood disorder  Father    2  Family history of Hypertension    Social History    · Former smoker (C16 08) (J80 693)   · Occasional alcohol use   · Denied: History of Physical abuse   ·   The social history was reviewed and is unchanged  End of Encounter Meds    1  Amphetamine-Dextroamphet ER 30 MG Oral Capsule Extended Release 24 Hour   (Adderall XR); take 1 capsule daily; Therapy: 12RQZ2743 to (VLTVYBKW:36GOS8750); Last Rx:60Ucu9293 Ordered   2  BuPROPion HCl ER (XL) 150 MG Oral Tablet Extended Release 24 Hour; 1 po qam;   Therapy: 60UUS7780 to (Last Rx:76Jxy5451)  Requested for: 68RFO4330 Ordered    3  LORazepam 1 MG Oral Tablet; take 1/2-1 tablet every 4 hours if needed; Therapy: 53WVK9444 to ([id number] [id number])  Requested for: 13FLP4085; Last   Rx:51Wzp5092 Ordered   4  OXcarbazepine 600 MG Oral Tablet; 1 po qam  and 1 po qhs;   Therapy: 95Hkv2574 to (Last Rx:10Jcb5288)  Requested for: 36LLY8576 Ordered   5  Zolpidem Tartrate 10 MG Oral Tablet; TAKE 1/2-1 TABLET BY MOUTH AT BEDTIME AS   NEEDED; Therapy: 00ULW1480 to ([phone] 41 [phone])  Requested for: 35CZF7362; Last   Rx:59Wtc1132 Ordered    6  Ortho Tri-Cyclen Lo 0 18/0 215/0 25 MG-25 MCG Oral Tablet (Norgestim-Eth Estrad   Triphasic); Take 1 tablet daily; Therapy: 28GDW6027 to (Last Rx:87Asv9914)  Requested for: 05Dty7117 Ordered    7  ValACYclovir HCl - 500 MG Oral Tablet (Valtrex); take 1 tablet twice a day; Therapy: 21JUD9111 to (Evaluate:93Mgo7199)  Requested for: 49YZU0293; Last   Rx:01Wxd1881 Ordered    8  Dexilant 60 MG Oral Capsule Delayed Release Recorded   9  Fluticasone Propionate 50 MCG/ACT Nasal Suspension; Therapy: 58GXK9548 to Recorded   10  Metoprolol Succinate ER 25 MG Oral Tablet Extended Release 24 Hour; Therapy: 50SMM9885 to Recorded   11  ProAir  (90 Base) MCG/ACT Inhalation Aerosol Solution;     Therapy: 95Gyw8040 to Recorded    Signatures   Electronically signed by : Leah Johnson, [facility]; Dec  5 2017  5:06PM EST                       (Author)    Electronically signed by : Miriam Villegas MD; Dec  6 2017  1:11PM EST

## 2018-04-05 RX ORDER — LORAZEPAM 1 MG/1
TABLET ORAL
Qty: 60 TABLET | Refills: 2 | OUTPATIENT
Start: 2018-04-05

## 2018-05-08 DIAGNOSIS — F32.9 MDD (MAJOR DEPRESSIVE DISORDER): Primary | ICD-10-CM

## 2018-05-08 RX ORDER — BUPROPION HYDROCHLORIDE 300 MG/1
TABLET ORAL
Qty: 30 TABLET | Refills: 1 | Status: SHIPPED | OUTPATIENT
Start: 2018-05-08 | End: 2018-06-20 | Stop reason: SDUPTHER

## 2018-05-09 ENCOUNTER — TELEPHONE (OUTPATIENT)
Dept: PSYCHIATRY | Facility: CLINIC | Age: 53
End: 2018-05-09

## 2018-05-09 DIAGNOSIS — F98.8 ATTENTION DEFICIT DISORDER (ADD) IN ADULT: Primary | ICD-10-CM

## 2018-05-09 RX ORDER — NORGESTIMATE AND ETHINYL ESTRADIOL 7DAYSX3 LO
KIT ORAL
Refills: 1 | COMMUNITY
Start: 2018-04-30 | End: 2018-08-02 | Stop reason: SDUPTHER

## 2018-05-09 RX ORDER — ZOLPIDEM TARTRATE 10 MG/1
TABLET ORAL
Refills: 0 | COMMUNITY
Start: 2018-04-26 | End: 2018-06-12 | Stop reason: SDUPTHER

## 2018-05-09 RX ORDER — DEXTROAMPHETAMINE SACCHARATE, AMPHETAMINE ASPARTATE MONOHYDRATE, DEXTROAMPHETAMINE SULFATE AND AMPHETAMINE SULFATE 7.5; 7.5; 7.5; 7.5 MG/1; MG/1; MG/1; MG/1
CAPSULE, EXTENDED RELEASE ORAL
COMMUNITY
End: 2018-05-09 | Stop reason: SDUPTHER

## 2018-05-09 RX ORDER — LORAZEPAM 1 MG/1
TABLET ORAL
COMMUNITY
Start: 2018-03-21 | End: 2018-05-09 | Stop reason: SDUPTHER

## 2018-05-09 RX ORDER — LORAZEPAM 1 MG/1
1 TABLET ORAL DAILY PRN
Qty: 30 TABLET | Refills: 0 | Status: SHIPPED | OUTPATIENT
Start: 2018-05-09 | End: 2018-06-20 | Stop reason: SDUPTHER

## 2018-05-09 RX ORDER — DEXTROAMPHETAMINE SACCHARATE, AMPHETAMINE ASPARTATE MONOHYDRATE, DEXTROAMPHETAMINE SULFATE AND AMPHETAMINE SULFATE 7.5; 7.5; 7.5; 7.5 MG/1; MG/1; MG/1; MG/1
30 CAPSULE, EXTENDED RELEASE ORAL EVERY MORNING
Qty: 30 CAPSULE | Refills: 0 | Status: SHIPPED | OUTPATIENT
Start: 2018-05-09 | End: 2018-06-20 | Stop reason: SDUPTHER

## 2018-05-09 RX ORDER — VALACYCLOVIR HYDROCHLORIDE 500 MG/1
TABLET, FILM COATED ORAL
Refills: 1 | COMMUNITY
Start: 2018-04-05 | End: 2019-01-27 | Stop reason: SDUPTHER

## 2018-06-12 DIAGNOSIS — F51.01 PRIMARY INSOMNIA: Primary | ICD-10-CM

## 2018-06-12 RX ORDER — ZOLPIDEM TARTRATE 10 MG/1
TABLET ORAL
Qty: 30 TABLET | Refills: 1 | OUTPATIENT
Start: 2018-06-12

## 2018-06-12 RX ORDER — ZOLPIDEM TARTRATE 10 MG/1
10 TABLET ORAL
Qty: 30 TABLET | Refills: 0 | Status: SHIPPED | OUTPATIENT
Start: 2018-06-12 | End: 2018-06-20 | Stop reason: SDUPTHER

## 2018-06-18 ENCOUNTER — TELEPHONE (OUTPATIENT)
Dept: PSYCHIATRY | Facility: CLINIC | Age: 53
End: 2018-06-18

## 2018-06-20 DIAGNOSIS — F32.9 MDD (MAJOR DEPRESSIVE DISORDER): ICD-10-CM

## 2018-06-20 DIAGNOSIS — F51.01 PRIMARY INSOMNIA: ICD-10-CM

## 2018-06-20 DIAGNOSIS — F98.8 ATTENTION DEFICIT DISORDER (ADD) IN ADULT: ICD-10-CM

## 2018-06-20 RX ORDER — BUPROPION HYDROCHLORIDE 300 MG/1
300 TABLET ORAL EVERY MORNING
Qty: 30 TABLET | Refills: 1 | Status: SHIPPED | OUTPATIENT
Start: 2018-06-20 | End: 2018-09-04 | Stop reason: SDUPTHER

## 2018-06-20 RX ORDER — LORAZEPAM 1 MG/1
1 TABLET ORAL DAILY PRN
Qty: 30 TABLET | Refills: 1 | Status: SHIPPED | OUTPATIENT
Start: 2018-06-20 | End: 2019-01-23 | Stop reason: SDUPTHER

## 2018-06-20 RX ORDER — ZOLPIDEM TARTRATE 10 MG/1
10 TABLET ORAL
Qty: 30 TABLET | Refills: 0 | Status: SHIPPED | OUTPATIENT
Start: 2018-06-20 | End: 2019-01-23 | Stop reason: SDUPTHER

## 2018-06-20 RX ORDER — DEXTROAMPHETAMINE SACCHARATE, AMPHETAMINE ASPARTATE MONOHYDRATE, DEXTROAMPHETAMINE SULFATE AND AMPHETAMINE SULFATE 7.5; 7.5; 7.5; 7.5 MG/1; MG/1; MG/1; MG/1
30 CAPSULE, EXTENDED RELEASE ORAL EVERY MORNING
Qty: 30 CAPSULE | Refills: 0 | Status: SHIPPED | OUTPATIENT
Start: 2018-06-20 | End: 2019-01-23 | Stop reason: SDUPTHER

## 2018-08-02 DIAGNOSIS — Z30.8 ENCOUNTER FOR OTHER CONTRACEPTIVE MANAGEMENT: Primary | ICD-10-CM

## 2018-08-03 RX ORDER — NORGESTIMATE AND ETHINYL ESTRADIOL 7DAYSX3 LO
KIT ORAL
Qty: 28 TABLET | Refills: 11 | Status: SHIPPED | OUTPATIENT
Start: 2018-08-03 | End: 2021-01-06 | Stop reason: ALTCHOICE

## 2018-09-04 DIAGNOSIS — F32.9 MDD (MAJOR DEPRESSIVE DISORDER): ICD-10-CM

## 2018-09-05 RX ORDER — BUPROPION HYDROCHLORIDE 300 MG/1
TABLET ORAL
Qty: 30 TABLET | Refills: 0 | Status: SHIPPED | OUTPATIENT
Start: 2018-09-05 | End: 2019-01-23 | Stop reason: SDUPTHER

## 2018-10-16 DIAGNOSIS — F98.8 ATTENTION DEFICIT DISORDER (ADD) IN ADULT: ICD-10-CM

## 2018-10-16 DIAGNOSIS — F32.9 MDD (MAJOR DEPRESSIVE DISORDER): ICD-10-CM

## 2018-10-16 DIAGNOSIS — F51.01 PRIMARY INSOMNIA: ICD-10-CM

## 2018-10-22 RX ORDER — ZOLPIDEM TARTRATE 10 MG/1
TABLET ORAL
Qty: 30 TABLET | Refills: 0 | OUTPATIENT
Start: 2018-10-22

## 2018-10-22 RX ORDER — LORAZEPAM 1 MG/1
TABLET ORAL
Qty: 30 TABLET | Refills: 1 | OUTPATIENT
Start: 2018-10-22

## 2018-10-22 RX ORDER — BUPROPION HYDROCHLORIDE 300 MG/1
TABLET ORAL
Qty: 30 TABLET | Refills: 0 | OUTPATIENT
Start: 2018-10-22

## 2019-01-23 DIAGNOSIS — F32.9 MDD (MAJOR DEPRESSIVE DISORDER): ICD-10-CM

## 2019-01-23 DIAGNOSIS — F51.01 PRIMARY INSOMNIA: ICD-10-CM

## 2019-01-23 DIAGNOSIS — F98.8 ATTENTION DEFICIT DISORDER (ADD) IN ADULT: ICD-10-CM

## 2019-01-23 RX ORDER — ZOLPIDEM TARTRATE 10 MG/1
10 TABLET ORAL
Qty: 30 TABLET | Refills: 0 | Status: SHIPPED | OUTPATIENT
Start: 2019-01-23 | End: 2021-01-06 | Stop reason: ALTCHOICE

## 2019-01-23 RX ORDER — BUPROPION HYDROCHLORIDE 300 MG/1
300 TABLET ORAL EVERY MORNING
Qty: 30 TABLET | Refills: 1 | Status: SHIPPED | OUTPATIENT
Start: 2019-01-23 | End: 2021-01-06 | Stop reason: ALTCHOICE

## 2019-01-23 RX ORDER — DEXTROAMPHETAMINE SACCHARATE, AMPHETAMINE ASPARTATE MONOHYDRATE, DEXTROAMPHETAMINE SULFATE AND AMPHETAMINE SULFATE 7.5; 7.5; 7.5; 7.5 MG/1; MG/1; MG/1; MG/1
30 CAPSULE, EXTENDED RELEASE ORAL EVERY MORNING
Qty: 30 CAPSULE | Refills: 0 | Status: SHIPPED | OUTPATIENT
Start: 2019-01-23 | End: 2021-01-06 | Stop reason: ALTCHOICE

## 2019-01-23 RX ORDER — LORAZEPAM 1 MG/1
1 TABLET ORAL DAILY PRN
Qty: 30 TABLET | Refills: 0 | Status: SHIPPED | OUTPATIENT
Start: 2019-01-23 | End: 2021-01-06 | Stop reason: ALTCHOICE

## 2019-01-23 NOTE — TELEPHONE ENCOUNTER
Kala Mckinnon called for refill on     Wellbutrin XL 300mg  Ambien 10mg  Adderall XR 30mg  Ativan 1 mg      [facility]# [phone]

## 2019-01-27 DIAGNOSIS — B00.9 HERPES: Primary | ICD-10-CM

## 2019-01-28 RX ORDER — VALACYCLOVIR HYDROCHLORIDE 500 MG/1
TABLET, FILM COATED ORAL
Qty: 90 TABLET | Refills: 3 | Status: SHIPPED | OUTPATIENT
Start: 2019-01-28 | End: 2021-01-06 | Stop reason: ALTCHOICE

## 2020-02-12 ENCOUNTER — TELEPHONE (OUTPATIENT)
Dept: PSYCHIATRY | Facility: CLINIC | Age: 55
End: 2020-02-12

## 2020-02-12 NOTE — TELEPHONE ENCOUNTER
Marlyn Tyler called asked if you can write her a letter describing her diagnosis and the purpose for the medication she is taking  Patient will  the letter when ready        Contact# [phone]

## 2020-11-11 ENCOUNTER — OFFICE VISIT (OUTPATIENT)
Dept: URGENT CARE | Facility: CLINIC | Age: 55
End: 2020-11-11
Payer: COMMERCIAL

## 2020-11-11 VITALS
HEIGHT: 66 IN | RESPIRATION RATE: 16 BRPM | BODY MASS INDEX: 27.32 KG/M2 | TEMPERATURE: 97.1 F | HEART RATE: 95 BPM | OXYGEN SATURATION: 98 % | WEIGHT: 170 LBS

## 2020-11-11 DIAGNOSIS — Z20.822 EXPOSURE TO COVID-19 VIRUS: ICD-10-CM

## 2020-11-11 DIAGNOSIS — J06.9 ACUTE URI: Primary | ICD-10-CM

## 2020-11-11 PROCEDURE — U0003 INFECTIOUS AGENT DETECTION BY NUCLEIC ACID (DNA OR RNA); SEVERE ACUTE RESPIRATORY SYNDROME CORONAVIRUS 2 (SARS-COV-2) (CORONAVIRUS DISEASE [COVID-19]), AMPLIFIED PROBE TECHNIQUE, MAKING USE OF HIGH THROUGHPUT TECHNOLOGIES AS DESCRIBED BY CMS-2020-01-R: HCPCS | Performed by: PHYSICIAN ASSISTANT

## 2020-11-11 PROCEDURE — 99203 OFFICE O/P NEW LOW 30 MIN: CPT | Performed by: FAMILY MEDICINE

## 2020-11-11 RX ORDER — ALBUTEROL SULFATE 90 UG/1
2 AEROSOL, METERED RESPIRATORY (INHALATION) EVERY 6 HOURS PRN
Qty: 8.5 G | Refills: 0 | Status: SHIPPED | OUTPATIENT
Start: 2020-11-11

## 2020-11-13 LAB — SARS-COV-2 RNA SPEC QL NAA+PROBE: NOT DETECTED

## 2021-01-03 ENCOUNTER — APPOINTMENT (OUTPATIENT)
Dept: RADIOLOGY | Facility: MEDICAL CENTER | Age: 56
End: 2021-01-03
Payer: COMMERCIAL

## 2021-01-03 ENCOUNTER — OFFICE VISIT (OUTPATIENT)
Dept: URGENT CARE | Facility: MEDICAL CENTER | Age: 56
End: 2021-01-03
Payer: COMMERCIAL

## 2021-01-03 VITALS
WEIGHT: 170 LBS | SYSTOLIC BLOOD PRESSURE: 141 MMHG | OXYGEN SATURATION: 99 % | BODY MASS INDEX: 26.68 KG/M2 | HEIGHT: 67 IN | HEART RATE: 96 BPM | TEMPERATURE: 98.6 F | DIASTOLIC BLOOD PRESSURE: 97 MMHG | RESPIRATION RATE: 20 BRPM

## 2021-01-03 DIAGNOSIS — R10.9 ABDOMINAL PAIN, UNSPECIFIED ABDOMINAL LOCATION: ICD-10-CM

## 2021-01-03 DIAGNOSIS — R10.9 ABDOMINAL PAIN, UNSPECIFIED ABDOMINAL LOCATION: Primary | ICD-10-CM

## 2021-01-03 PROCEDURE — 99213 OFFICE O/P EST LOW 20 MIN: CPT | Performed by: FAMILY MEDICINE

## 2021-01-03 PROCEDURE — 74022 RADEX COMPL AQT ABD SERIES: CPT

## 2021-01-03 RX ORDER — DICYCLOMINE HYDROCHLORIDE 10 MG/1
10 CAPSULE ORAL
Qty: 30 CAPSULE | Refills: 0 | Status: SHIPPED | OUTPATIENT
Start: 2021-01-03 | End: 2021-01-06 | Stop reason: ALTCHOICE

## 2021-01-03 RX ORDER — DICYCLOMINE HYDROCHLORIDE 10 MG/1
10 CAPSULE ORAL ONCE
Status: COMPLETED | OUTPATIENT
Start: 2021-01-03 | End: 2021-01-03

## 2021-01-03 RX ADMIN — DICYCLOMINE HYDROCHLORIDE 10 MG: 10 CAPSULE ORAL at 13:45

## 2021-01-03 NOTE — PATIENT INSTRUCTIONS
OBSTRUCTION SERIES X-RAY REVEALS NO EVIDENCE OF BOWEL OBSTRUCTION is increased stools on the right ascending colon  Patient was given 1 dose of dicyclomine 10 mg capsule orally in the office  I also prescribed dicyclomine capsule Q 4-6 hours at bedtime  She is to maintain a clear liquid diet for 24 hours and she is to go to the emergency room with abdominal pain worsens  Abdominal Pain   WHAT YOU NEED TO KNOW:   Abdominal pain can be dull, achy, or sharp  You may have pain in one area of your abdomen, or in your entire abdomen  Your pain may be caused by a condition such as constipation, food sensitivity or poisoning, infection, or a blockage  Abdominal pain can also be from a hernia, appendicitis, or an ulcer  Liver, gallbladder, or kidney conditions can also cause abdominal pain  The cause of your abdominal pain may be unknown  DISCHARGE INSTRUCTIONS:   Return to the emergency department if:   · You have new chest pain or shortness of breath  · You have pulsing pain in your upper abdomen or lower back that suddenly becomes constant  · Your pain is in the right lower abdominal area and worsens with movement  · You have a fever over 100 4°F (38°C) or shaking chills  · You are vomiting and cannot keep food or liquids down  · Your pain does not improve or gets worse over the next 8 to 12 hours  · You see blood in your vomit or bowel movements, or they look black and tarry  · Your skin or the whites of your eyes turn yellow  · You are a woman and have a large amount of vaginal bleeding that is not your monthly period  Contact your healthcare provider if:   · You have pain in your lower back  · You are a man and have pain in your testicles  · You have pain when you urinate  · You have questions or concerns about your condition or care      Follow up with your healthcare provider within 24 hours or as directed:  Write down your questions so you remember to ask them during your visits  Medicines:   · Medicines  may be given to calm your stomach and prevent vomiting or to decrease pain  Ask how to take pain medicine safely  · Take your medicine as directed  Contact your healthcare provider if you think your medicine is not helping or if you have side effects  Tell him of her if you are allergic to any medicine  Keep a list of the medicines, vitamins, and herbs you take  Include the amounts, and when and why you take them  Bring the list or the pill bottles to follow-up visits  Carry your medicine list with you in case of an emergency  © Copyright [facility] Information is for End User's use only and may not be sold, redistributed or otherwise used for commercial purposes  All illustrations and images included in CareNotes® are the copyrighted property of [employer]  or [facility] [employer]   The above information is an  only  It is not intended as medical advice for individual conditions or treatments  Talk to your doctor, nurse or pharmacist before following any medical regimen to see if it is safe and effective for you

## 2021-01-03 NOTE — PROGRESS NOTES
[facility]        NAME: Radha Crespo is a 50 y o  female  : 1965    MRN: [id number]  DATE: 2021  TIME: 7:51 AM    Assessment and Plan   Abdominal pain, unspecified abdominal location [R10 9]  1  Abdominal pain, unspecified abdominal location  XR abdomen obstruction series    dicyclomine (BENTYL) capsule 10 mg    dicyclomine (BENTYL) 10 mg capsule         Patient Instructions       Follow up with PCP in 3-5 days  Proceed to  ER if symptoms worsen  Chief Complaint     Chief Complaint   Patient presents with    Abdominal Pain     Patient states she has beenhaving abd pain since Wednesday  Currently it is sharpin nature in the center of her abd  She is having normal BM's  no nausea or vomiting  She states it feels like cramping  Last period three months ago  No vaginal bleeding or discharge         History of Present Illness       53-year-old female with lower abdominal pain for the last 4 days  Abdominal pain has been cramping in nature fluctuant however now it is constant and stabbing in nature  Denies any accompanied fevers or chills  Denies nausea, vomiting or diarrhea  She has a known history of constipation  Last bowel movement was today  Denies any blood in the stools  Patient informs me she has a known history of GERD and gastroparesis  Patient is soon the pain was menstrual in nature however last menstrual flow was approximate 3 or 4 months ago  She took some ibuprofen with no significant improvement  Her past surgical history significant for abdominal plasty  Review of Systems   Review of Systems   Constitutional: Negative  Respiratory: Negative  Cardiovascular: Negative  Gastrointestinal: Positive for abdominal pain  Negative for constipation, diarrhea and nausea  Genitourinary: Negative            Current Medications       Current Outpatient Medications:     albuterol (ProAir HFA) 90 mcg/act inhaler, Inhale 2 puffs every 6 (six) hours as needed for wheezing (Patient not taking: Reported on 1/3/2021), Disp: 8 5 g, Rfl: 0    amphetamine-dextroamphetamine (ADDERALL XR, 30MG,) 30 MG 24 hr capsule, Take 1 capsule (30 mg total) by mouth every morning Max Daily Amount: 30 mg (Patient not taking: Reported on 2020), Disp: 30 capsule, Rfl: 0    buPROPion (WELLBUTRIN XL) 300 mg 24 hr tablet, Take 1 tablet (300 mg total) by mouth every morning (Patient not taking: Reported on 2020), Disp: 30 tablet, Rfl: 1    DEXILANT 60 MG capsule, , Disp: , Rfl: 0    dicyclomine (BENTYL) 10 mg capsule, Take 1 capsule (10 mg total) by mouth 4 (four) times a day (before meals and at bedtime), Disp: 30 capsule, Rfl: 0    LORazepam (ATIVAN) 1 mg tablet, Take 1 tablet (1 mg total) by mouth daily as needed for anxiety (Patient not taking: Reported on 2020), Disp: 30 tablet, Rfl: 0    norgestimate-ethinyl estradiol (ORTHO TRI-CYCLEN LO) 0 18/0 215/0 25 MG-25 MCG per tablet, take 1 tablet by mouth once daily (Patient not taking: Reported on 2020), Disp: 28 tablet, Rfl: 11    valACYclovir (VALTREX) 500 mg tablet, take 1 tablet by mouth once daily, Disp: 90 tablet, Rfl: 3    zolpidem (AMBIEN) 10 mg tablet, Take 1 tablet (10 mg total) by mouth daily at bedtime as needed for sleep (Patient not taking: Reported on 2020), Disp: 30 tablet, Rfl: 0    Current Allergies     Allergies as of 2021 - Reviewed 2021   Allergen Reaction Noted    Morphine  2012    Oxycodone Other (See Comments) 2010            The following portions of the patient's history were reviewed and updated as appropriate: allergies, current medications, past family history, past medical history, past social history, past surgical history and problem list      Past Medical History:   Diagnosis Date    Asthma     Bipolar 1 disorder ([address] )     Gastroparesis        Past Surgical History:   Procedure Laterality Date    BREAST SURGERY       SECTION      FISTULA REPAIR         History reviewed  No pertinent family history  Medications have been verified  Objective   /97   Pulse 96   Temp 98 6 °F (37 °C)   Resp 20   Ht 5' 7" (1 702 m)   Wt 77 1 kg (170 lb)   LMP  (LMP Unknown)   SpO2 99%   BMI 26 63 kg/m²   No LMP recorded (lmp unknown)  Patient is perimenopausal        Physical Exam     Physical Exam  Vitals signs and nursing note reviewed  Constitutional:       Appearance: She is well-developed  Cardiovascular:      Heart sounds: Normal heart sounds  Pulmonary:      Effort: Pulmonary effort is normal       Breath sounds: Normal breath sounds  Abdominal:      General: Abdomen is flat  Bowel sounds are normal       Palpations: There is no fluid wave or mass  Tenderness: There is abdominal tenderness in the right upper quadrant, right lower quadrant and suprapubic area  There is no right CVA tenderness, left CVA tenderness, guarding or rebound  Negative signs include Verdugo's sign  Hernia: No hernia is present  Comments: Well-healed suprapubic scar from previous surgery  No evidence of hernia  Neurological:      Mental Status: She is alert

## 2021-01-06 ENCOUNTER — OFFICE VISIT (OUTPATIENT)
Dept: GYNECOLOGY | Facility: CLINIC | Age: 56
End: 2021-01-06
Payer: COMMERCIAL

## 2021-01-06 ENCOUNTER — ULTRASOUND (OUTPATIENT)
Dept: GYNECOLOGY | Facility: CLINIC | Age: 56
End: 2021-01-06
Payer: COMMERCIAL

## 2021-01-06 VITALS
DIASTOLIC BLOOD PRESSURE: 82 MMHG | BODY MASS INDEX: 27.32 KG/M2 | HEIGHT: 66 IN | HEART RATE: 100 BPM | WEIGHT: 170 LBS | SYSTOLIC BLOOD PRESSURE: 122 MMHG

## 2021-01-06 DIAGNOSIS — R10.30 LOWER ABDOMINAL PAIN: Primary | ICD-10-CM

## 2021-01-06 DIAGNOSIS — R10.2 PELVIC PAIN: Primary | ICD-10-CM

## 2021-01-06 PROCEDURE — 76830 TRANSVAGINAL US NON-OB: CPT | Performed by: OBSTETRICS & GYNECOLOGY

## 2021-01-06 PROCEDURE — 99203 OFFICE O/P NEW LOW 30 MIN: CPT | Performed by: OBSTETRICS & GYNECOLOGY

## 2021-01-06 RX ORDER — NAPROXEN SODIUM 550 MG/1
550 TABLET ORAL 2 TIMES DAILY WITH MEALS
Qty: 30 TABLET | Refills: 1 | Status: SHIPPED | OUTPATIENT
Start: 2021-01-06 | End: 2021-07-15 | Stop reason: ALTCHOICE

## 2021-01-06 RX ORDER — OXYCODONE HYDROCHLORIDE AND ACETAMINOPHEN 5; 325 MG/1; MG/1
1 TABLET ORAL EVERY 4 HOURS PRN
Qty: 10 TABLET | Refills: 0 | Status: SHIPPED | OUTPATIENT
Start: 2021-01-06 | End: 2021-07-15 | Stop reason: ALTCHOICE

## 2021-01-06 NOTE — PROGRESS NOTES
AMB US Pelvic Non OB    Date/Time: 1/6/2021 3:35 PM  Performed by: Barney Ignacio  Authorized by: Dutch Rodriguez DO   Universal Protocol:  Patient identity confirmed: verbally with patient      Procedure details:     Technique:  Transvaginal US, Non-OB    Position: lithotomy exam    Uterine findings:     Length (cm): 8 3    Height (cm):  6 7    Width (cm):  6 11    Endometrial stripe: identified      Endometrium thickness (mm):  5 6  Left ovary findings:     Left ovary:  Visualized    Length (cm): 3 73    Height (cm): 2 2    Width (cm): 2 29  Right ovary findings:     Right ovary:  Visualized    Length (cm): 2 89    Height (cm): 1 84    Width (cm): 1 81  Other findings:     Free pelvic fluid: not identified      Free peritoneal fluid: not identified    Post-Procedure Details:     Impression:  Anteverted uterus demonstrates a large intramural fibroid 3 8cm which may be submucosal  The bilateral ovaries appear within normal limits  No free fluid  Tolerance: Tolerated well, no immediate complications    Complications: no complications    Additional Procedure Comments:      [employer] P5 transvaginal transducer E8C with Serial Number [id number] was used during procedure and subsequently cleaned with high level disinfection utilizing the [employer]       Ultrasound performed at:     [facility] for [address]  [address], [address]  Phone: [phone]  Fax:  [phone]

## 2021-01-06 NOTE — PROGRESS NOTES
Assessment/Plan:         Diagnoses and all orders for this visit:    Lower abdominal pain/ fibroid uterus, possibly degenerating;  Reviewed ultrasound findings with patient  Ovaries appeared normal   I suspect fibroid is degenerating and this is the cause for her pain  A script was called in for naproxen double strength along with Percocet  If the pain persists she will return to the office  Subjective:      Patient ID: Hayde Tavarez is a 50 y o  female  HPI  patient presents to the office complaining of intermittent sharp stabbing pain in her lower abdomen  This has been on and off over the past week  This has been unresponsive to ibuprofen  She was seen at the urgent care center on   Suspicion was IBS  She was placed on Bentyl  An obstruction series was done and was negative  She has no associated nausea vomiting diarrhea or constipation  Denies any dysuria, hematuria urgency or urinary incontinence or change in frequency of urination  Menses are generally every 3 months over the past year  Urinalysis today:  Negative    The following portions of the patient's history were reviewed and updated as appropriate:   She  has a past medical history of Asthma, Bipolar 1 disorder ([facility] [address] ), and Gastroparesis  She   Patient Active Problem List    Diagnosis Date Noted    ADD (attention deficit disorder) 2015    Bipolar 1 disorder ([address] ) 2012     She  has a past surgical history that includes  section; Breast surgery; and FISTULA REPAIR  Her family history is not on file  She  reports that she has been smoking  She has been smoking about 0 50 packs per day  She has never used smokeless tobacco  She reports current alcohol use  She reports previous drug use    Current Outpatient Medications   Medication Sig Dispense Refill    albuterol (ProAir HFA) 90 mcg/act inhaler Inhale 2 puffs every 6 (six) hours as needed for wheezing (Patient not taking: Reported on 1/3/2021) 8 5 g 0     No current facility-administered medications for this visit  Current Outpatient Medications on File Prior to Visit   Medication Sig    albuterol (ProAir HFA) 90 mcg/act inhaler Inhale 2 puffs every 6 (six) hours as needed for wheezing (Patient not taking: Reported on 1/3/2021)    [DISCONTINUED] amphetamine-dextroamphetamine (ADDERALL XR, 30MG,) 30 MG 24 hr capsule Take 1 capsule (30 mg total) by mouth every morning Max Daily Amount: 30 mg (Patient not taking: Reported on 11/11/2020)    [DISCONTINUED] buPROPion (WELLBUTRIN XL) 300 mg 24 hr tablet Take 1 tablet (300 mg total) by mouth every morning (Patient not taking: Reported on 11/11/2020)    [DISCONTINUED] DEXILANT 60 MG capsule     [DISCONTINUED] dicyclomine (BENTYL) 10 mg capsule Take 1 capsule (10 mg total) by mouth 4 (four) times a day (before meals and at bedtime)    [DISCONTINUED] LORazepam (ATIVAN) 1 mg tablet Take 1 tablet (1 mg total) by mouth daily as needed for anxiety (Patient not taking: Reported on 11/11/2020)    [DISCONTINUED] norgestimate-ethinyl estradiol (ORTHO TRI-CYCLEN LO) 0 18/0 215/0 25 MG-25 MCG per tablet take 1 tablet by mouth once daily (Patient not taking: Reported on 11/11/2020)    [DISCONTINUED] valACYclovir (VALTREX) 500 mg tablet take 1 tablet by mouth once daily    [DISCONTINUED] zolpidem (AMBIEN) 10 mg tablet Take 1 tablet (10 mg total) by mouth daily at bedtime as needed for sleep (Patient not taking: Reported on 11/11/2020)     No current facility-administered medications on file prior to visit  She is allergic to morphine and oxycodone       Review of Systems   Constitutional: Negative  Gastrointestinal: Positive for abdominal pain  Negative for blood in stool, constipation, diarrhea, nausea and vomiting  Genitourinary: Positive for pelvic pain  Negative for difficulty urinating, dysuria, enuresis, flank pain, frequency, hematuria, menstrual problem, urgency and vaginal discharge  Objective:      /82 (BP Location: Left arm, Patient Position: Sitting, Cuff Size: Standard)   Pulse 100   Ht 5' 6" (1 676 m)   Wt 77 1 kg (170 lb)   LMP  (LMP Unknown)   BMI 27 44 kg/m²          Physical Exam  Vitals signs reviewed  Constitutional:       Appearance: Normal appearance  Abdominal:      General: There is no distension  Palpations: Abdomen is soft  There is no mass  Tenderness: There is abdominal tenderness (Tender midline suprapubic)  There is no guarding or rebound  Hernia: No hernia is present  There is no hernia in the left inguinal area or right inguinal area  Genitourinary:     General: Normal vulva  Labia:         Right: No rash, tenderness or lesion  Left: No rash, tenderness or lesion  Vagina: Normal       Cervix: Normal       Uterus: Enlarged and tender  Not deviated, not fixed and no uterine prolapse  Adnexa:         Right: No mass, tenderness or fullness  Left: No mass, tenderness or fullness  Lymphadenopathy:      Lower Body: No right inguinal adenopathy  No left inguinal adenopathy  Neurological:      Mental Status: She is alert         AMB US Pelvic Non OB   Date/Time: 1/6/2021 3:35 PM  Performed by: Reji Montero  Authorized by: Prateek Arce DO   Universal Protocol:  Patient identity confirmed: verbally with patient        Procedure details:     Technique:  Transvaginal US, Non-OB    Position: lithotomy exam    Uterine findings:     Length (cm): 8 3    Height (cm):  6 7    Width (cm):  6 11    Endometrial stripe: identified      Endometrium thickness (mm):  5 6  Left ovary findings:     Left ovary:  Visualized    Length (cm): 3 73    Height (cm): 2 2    Width (cm): 2 29  Right ovary findings:     Right ovary:  Visualized    Length (cm): 2 89    Height (cm): 1 84    Width (cm): 1 81  Other findings:     Free pelvic fluid: not identified      Free peritoneal fluid: not identified    Post-Procedure Details: Impression:  Anteverted uterus demonstrates a large intramural fibroid 3 8cm which may be submucosal  The bilateral ovaries appear within normal limits  No free fluid  Tolerance:   Tolerated well, no immediate complications    Complications: no complications

## 2021-02-19 ENCOUNTER — TELEMEDICINE (OUTPATIENT)
Dept: PSYCHIATRY | Facility: CLINIC | Age: 56
End: 2021-02-19
Payer: COMMERCIAL

## 2021-02-19 DIAGNOSIS — F51.01 PRIMARY INSOMNIA: ICD-10-CM

## 2021-02-19 DIAGNOSIS — F31.9 BIPOLAR 1 DISORDER (HCC): Primary | ICD-10-CM

## 2021-02-19 DIAGNOSIS — F41.9 ANXIETY: ICD-10-CM

## 2021-02-19 PROCEDURE — 90791 PSYCH DIAGNOSTIC EVALUATION: CPT | Performed by: PHYSICIAN ASSISTANT

## 2021-02-19 RX ORDER — LAMOTRIGINE 25 MG/1
25 TABLET ORAL
Qty: 60 TABLET | Refills: 1 | Status: SHIPPED | OUTPATIENT
Start: 2021-02-19 | End: 2021-03-19 | Stop reason: SDUPTHER

## 2021-02-19 RX ORDER — BUPROPION HYDROCHLORIDE 150 MG/1
150 TABLET ORAL DAILY
Qty: 30 TABLET | Refills: 1 | Status: SHIPPED | OUTPATIENT
Start: 2021-02-19 | End: 2021-03-19 | Stop reason: SDUPTHER

## 2021-02-19 NOTE — BH TREATMENT PLAN
TREATMENT PLAN (Medication Management Only)        [facility]    Name and Date of Birth:  Carson Huerta 50 y o  1965  Date of Treatment Plan: February 19, 2021  Diagnosis/Diagnoses:    1  Bipolar 1 disorder ([address] )    2  Anxiety    3  Primary insomnia      Strengths/Personal Resources for Self-Care: good understanding of illness, motivation for treatment, stable employment  Area/Areas of need (in own words): depression  1  Long Term Goal: improve control of depression  Target Date:3 months - 5/19/2021  Person/Persons responsible for completion of goal: Jim Earnest  2  Short Term Objective (s) - How will we reach this goal?:   A  Provider new recommended medication/dosage changes and/or continue medication(s): Start Lamictal and Wellbutrin XL   B  N/A   C  N/A  Target Date:3 months - 5/19/2021  Person/Persons Responsible for Completion of Goal: Jim Earnest  Progress Towards Goals: starting treatment  Treatment Modality: medication management every 1 month  Review due 180 days from date of this plan: 6 months - 8/19/2021  Expected length of service: ongoing treatment  My Physician/PA/NP and I have developed this plan together and I agree to work on the goals and objectives  I understand the treatment goals that were developed for my treatment

## 2021-02-19 NOTE — PSYCH
Virtual Regular Visit      Assessment/Plan:    Problem List Items Addressed This Visit        Other    Bipolar 1 disorder ([facility] [address] ) - Primary    Relevant Medications    buPROPion (WELLBUTRIN XL) 150 mg 24 hr tablet    lamoTRIgine (LaMICtal) 25 mg tablet      Other Visit Diagnoses     Anxiety        Primary insomnia                   Reason for visit is   Chief Complaint   Patient presents with    Anxiety    Depression    Virtual Regular Visit        Encounter provider Lynn Hong PA-C    Provider located at [address] [address]      Recent Visits  No visits were found meeting these conditions  Showing recent visits within past 7 days and meeting all other requirements     Today's Visits  Date Type Provider Dept   02/19/21 Telemedicine [facility] [address] today's visits and meeting all other requirements     Future Appointments  No visits were found meeting these conditions  Showing future appointments within next 150 days and meeting all other requirements        The patient was identified by name and date of birth  Juliette Robertson was informed that this is a telemedicine visit and that the visit is being conducted through [place] and patient was informed that this is a secure, HIPAA-compliant platform  She agrees to proceed     My office door was closed  No one else was in the room  She acknowledged consent and understanding of privacy and security of the video platform  The patient has agreed to participate and understands they can discontinue the visit at any time  Patient is aware this is a billable service  Subjective  Juliette Robertson is a 50 y o  female With history of bipolar disorder and adult ADD   HPI    Leonora Mcclain is a 22-year-old  female with a history of bipolar disorder and adult ADD      She is known from previous treatment at our office and was last seen 4 years ago  Unfortunately she lost her insurance and has been unable to continue with her medications and treatment  She has been off her medications for about two years  States since being off of her medication she has been feeling increasingly depressed and anxious  States she continuously feels a high level of anxiety  Feels easily overwhelmed  Also reports feeling depressed and easily tearful  Denies any suicidal ideation  Denies racing thoughts but  "always feels as though she has to be doing something"  States that she will always have the TV on in the background or trying to keep her mind occupied doing other things  Difficulty with focus and concentration and she has not been able to read a book in years  Last manic episode was about 10 years ago  During that time she was hyper focused  for several daysand accomplished lot of things around the house such as construction   And electrical work which she typically does not do  After that episode she has been in "a constant state of depression "  Sleep has been variable  Reports that she goes to bed about the same time every night, between 10pm and 11pm, but awakens at different times  Sometimes she will awaken at 03:00, 04:00  On average though reports that she sleeps about 6 hours at night but "  Always feels tired"  Appetite is adequate  She was diagnosed with gastroparesis about six years ago  Unfortunately she has been unable to continue with the medication prescribed by Gastroenterology due to prohibitive cost   She eats small meals throughout the day  And has been maintaining about the same weight  She has stressors at home related to her living situation  Currently resides with her 59-year-old daughter and grand children who are ages [de-identified] and two  Her daughter has mental health issues so Mimi Sharona states that she often helps to care for her grandchildren  This has been quite stressful for her  She also works full-time and has no time for herself  Social History: , spouse passed away Five years ago  She has two daughters ages  216   51-year-old daughter lives with her   grandson age [de-identified] and granddaughter age 3 who also live with her   Currently is employed by [employer] for the past year  Positive  Nicotine,smokes a quarter pack per day x5 years   No alcohol   No other substance use, no marijuana, cocaine, heroin     Family history: Mother age [de-identified] has bipolar disorder with psychotic symptoms but does not take medication   Father age 80 has Alzheimer's dementia   daughter age 32 has bipolar disorder  Daughter age 34 mental health disorder   no family history of suicide    Past Medical History:   Diagnosis Date    Asthma     Bipolar 1 disorder ([facility] [address] )     Gastroparesis        Past Surgical History:   Procedure Laterality Date    BREAST SURGERY       SECTION      FISTULA REPAIR         Current Outpatient Medications   Medication Sig Dispense Refill    albuterol (ProAir HFA) 90 mcg/act inhaler Inhale 2 puffs every 6 (six) hours as needed for wheezing (Patient not taking: Reported on 1/3/2021) 8 5 g 0    buPROPion (WELLBUTRIN XL) 150 mg 24 hr tablet Take 1 tablet (150 mg total) by mouth daily 30 tablet 1    lamoTRIgine (LaMICtal) 25 mg tablet Take 1 tablet (25 mg total) by mouth daily at bedtime 1 po qd x 14 days then 2 po qd 60 tablet 1    naproxen sodium (ANAPROX) 550 mg tablet Take 1 tablet (550 mg total) by mouth 2 (two) times a day with meals 30 tablet 1    oxyCODONE-acetaminophen (PERCOCET) 5-325 mg per tablet Take 1 tablet by mouth every 4 (four) hours as needed for moderate pain or severe painMax Daily Amount: 6 tablets 10 tablet 0     No current facility-administered medications for this visit           Allergies   Allergen Reactions    Morphine     Oxycodone Other (See Comments)     severe itching/wired       Review of Systems    joint pain in hip and elbow, variable sleep, adequate appetite, review of systems otherwise unremarkable    Video Exam    There were no vitals filed for this visit  Physical Exam   Mental status exam:   Good hygiene, good eye contact   Speech is clear and coherent, normal rate and volume  Mood is depressed, anxious, affect is constricted  Linear and coherent thought process   No suicidal or homicidal ideation   No psychotic signs or symptoms, no hallucinations or delusions   Cognition is intact  Insight and judgment is intact     Treatment plan: Will start lamotrigine 25 mg daily at bedtime times 14 days then increase to two at bedtime  For mood stabilization  Discussed potential side effect of rash and she will notify me immediately if this develops  Wellbutrin  mg in the morning for depression and attention deficit symptoms, patient has no history of seizure disorder or epilepsy  Will follow-up within one month and she will call sooner if any questions or concerns    I spent 30 minutes directly with the patient during this visit and additional 20 minutes reviewing chart      VIRTUAL VISIT DISCLAIMER    Ellis Adams acknowledges that she has consented to an online visit or consultation  She understands that the online visit is based solely on information provided by her, and that, in the absence of a face-to-face physical evaluation by the physician, the diagnosis she receives is both limited and provisional in terms of accuracy and completeness  This is not intended to replace a full medical face-to-face evaluation by the physician  Ellis Adams understands and accepts these terms

## 2021-03-19 ENCOUNTER — OFFICE VISIT (OUTPATIENT)
Dept: PSYCHIATRY | Facility: CLINIC | Age: 56
End: 2021-03-19
Payer: COMMERCIAL

## 2021-03-19 DIAGNOSIS — F41.9 ANXIETY: ICD-10-CM

## 2021-03-19 DIAGNOSIS — F31.9 BIPOLAR 1 DISORDER (HCC): Primary | ICD-10-CM

## 2021-03-19 DIAGNOSIS — F51.01 PRIMARY INSOMNIA: ICD-10-CM

## 2021-03-19 PROCEDURE — 99214 OFFICE O/P EST MOD 30 MIN: CPT | Performed by: PHYSICIAN ASSISTANT

## 2021-03-19 RX ORDER — LAMOTRIGINE 25 MG/1
TABLET ORAL
Qty: 120 TABLET | Refills: 1 | Status: SHIPPED | OUTPATIENT
Start: 2021-03-19 | End: 2021-04-15 | Stop reason: SDUPTHER

## 2021-03-19 RX ORDER — BUPROPION HYDROCHLORIDE 300 MG/1
300 TABLET ORAL EVERY MORNING
Qty: 30 TABLET | Refills: 2 | Status: SHIPPED | OUTPATIENT
Start: 2021-03-19 | End: 2021-04-15 | Stop reason: SDUPTHER

## 2021-03-19 NOTE — PSYCH
PROGRESS NOTE        [facility]      Name and Date of Birth:  Trinidad Montalvo 50 y o  1965    Date of Visit: 03/19/21    SUBJECTIVE:   Felicita Cuello was seen for follow-up of bipolar disorder, anxiety, insomnia and medication change  She is "about the same" as far as depression  Has not noted much of an improvement since medication changes made last month  She is currently taking lamotrigine 50 mg at bedtime Wellbutrin  mg the morning  No adverse effects noted with the medications though  No rash  Reports that her anxiety has been stable  Typically her anxiety is exacerbated by his social settings  She continues to work full-time states this has been going well  Outside of work though she does not have time for herself  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  Continues with frustrations with her daughter who suffers from bipolar disorder that has not been yet treated  Unfortunately her daughter had to reschedule an appointment with Psychiatry and is unable to get an appointment now for a few months  Sleep is still disrupted  And frequent awakenings  Appetite is the same and still decreased due to gastroparesis but maintaining her weight  Reports little interest and motivation  Feels tired during the day due to not sleeping well at night  No evidence of mary beth or manic symptoms  Physically she is feeling okay and no new medications are medical issues noted  She denies suicidal ideation, intent or plan at present, has no suicidal ideation, intent or plan at present  She denies any auditory hallucinations and visual hallucinations, denies any other delusional thinking, denies any delusional thinking  She denies any side effects from medications      HPI ROS Appetite Changes and Sleep:  decreased appetite,   Decreased sleep    Review Of Systems:      Constitutional Negative   ENT Negative   Cardiovascular Negative Respiratory Negative   Gastrointestinal Negative   Genitourinary Negative   Musculoskeletal Negative   Integumentary Negative   Neurological Negative   Endocrine Negative   Other Symptoms Negative and None       Laboratory Results: No results found for this or any previous visit  Substance Abuse History:    Social History     Substance and Sexual Activity   Drug Use Not Currently       Family Psychiatric History:     No family history on file  The following portions of the patient's history were reviewed and updated as appropriate: past family history, past medical history, past social history, past surgical history and problem list     Social History     Socioeconomic History    Marital status:      Spouse name: Not on file    Number of children: Not on file    Years of education: Not on file    Highest education level: Not on file   Occupational History    Not on file   Social Needs    Financial resource strain: Not on file    Food insecurity     Worry: Not on file     Inability: Not on file    Transportation needs     Medical: Not on file     Non-medical: Not on file   Tobacco Use    Smoking status: Current Every Day Smoker     Packs/day: 0 50    Smokeless tobacco: Never Used   Substance and Sexual Activity    Alcohol use:  Yes    Drug use: Not Currently    Sexual activity: Not on file   Lifestyle    Physical activity     Days per week: Not on file     Minutes per session: Not on file    Stress: Not on file   Relationships    Social connections     Talks on phone: Not on file     Gets together: Not on file     Attends Restorationist service: Not on file     Active member of club or organization: Not on file     Attends meetings of clubs or organizations: Not on file     Relationship status: Not on file    Intimate partner violence     Fear of current or ex partner: Not on file     Emotionally abused: Not on file     Physically abused: Not on file     Forced sexual activity: Not on file Other Topics Concern    Not on file   Social History Narrative    Not on file     Social History     Social History Narrative    Not on file        Social History     Tobacco History     Smoking Status  Current Every Day Smoker Smoking Frequency  0 5 packs/day    Smokeless Tobacco Use  Never Used          Alcohol History     Alcohol Use Status  Yes          Drug Use     Drug Use Status  Not Currently          Sexual Activity     Sexually Active  Not Asked          Activities of Daily Living    Not Asked                     OBJECTIVE:     Mental Status Evaluation:    Appearance age appropriate, casually dressed   Behavior  good eye contact, cooperative   Speech normal volume, normal pitch   Mood  depressed   Affect  congruent   Thought Processes logical   Associations intact associations   Thought Content normal   Perceptual Disturbances: none   Abnormal Thoughts  Risk Potential Suicidal ideation - None  Homicidal ideation - None  Potential for aggression - No   Orientation oriented to person, place, time/date and situation   Memory recent and remote memory grossly intact   Cosciousness alert and awake   Attention Span attention span and concentration are age appropriate   Intellect  not formally assessed   Insight age appropriate    Judgement good    Muscle Strength and  Gait  steady gait   Language  within normal limits   Fund of Knowledge displays adequate knowledge of current events   Pain none   Pain Scale 0       Assessment/Plan:       Diagnoses and all orders for this visit:    Bipolar 1 disorder (HCC)  -     buPROPion (WELLBUTRIN XL) 300 mg 24 hr tablet;  Take 1 tablet (300 mg total) by mouth every morning  -     lamoTRIgine (LaMICtal) 25 mg tablet; 3 po qhs x 14 days then 4 po qhs    Anxiety    Primary insomnia          Treatment Recommendations/Precautions:    Lamotrigine 25 mg two at bedtime   Continue with lamotrigine titration and increase to 75 mg  daily for 14 days then 100 mg daily  Mihir Martin reports difficulty remembering to take the medication at bedtime so she will switch to daytime   understands if medication makes her somnolence she will change to bedtime  Continue to monitor for rash  Will increase Wellbutrin XL to 300 mg the morning  Follow-up in about 4-6 weeks and she will call sooner if any questions or concerns        Risks/Benefits      Risks, Benefits And Possible Side Effects Of Medications:    Risks, benefits, and possible side effects of medications explained to patient and patient verbalizes understanding and agreement for treatment      Controlled Medication Discussion:     Not applicable    Psychotherapy Provided:     Individual psychotherapy provided: No

## 2021-03-30 DIAGNOSIS — Z23 ENCOUNTER FOR IMMUNIZATION: ICD-10-CM

## 2021-04-15 ENCOUNTER — OFFICE VISIT (OUTPATIENT)
Dept: PSYCHIATRY | Facility: CLINIC | Age: 56
End: 2021-04-15
Payer: COMMERCIAL

## 2021-04-15 DIAGNOSIS — F41.9 ANXIETY: ICD-10-CM

## 2021-04-15 DIAGNOSIS — F31.9 BIPOLAR 1 DISORDER (HCC): Primary | ICD-10-CM

## 2021-04-15 PROCEDURE — 99213 OFFICE O/P EST LOW 20 MIN: CPT | Performed by: PHYSICIAN ASSISTANT

## 2021-04-15 RX ORDER — LAMOTRIGINE 100 MG/1
TABLET ORAL
Qty: 30 TABLET | Refills: 2 | Status: SHIPPED | OUTPATIENT
Start: 2021-04-15 | End: 2021-07-15 | Stop reason: SDUPTHER

## 2021-04-15 RX ORDER — BUPROPION HYDROCHLORIDE 300 MG/1
300 TABLET ORAL EVERY MORNING
Qty: 30 TABLET | Refills: 2 | Status: SHIPPED | OUTPATIENT
Start: 2021-04-15 | End: 2021-07-15

## 2021-04-15 NOTE — PSYCH
PROGRESS NOTE        [facility]      Name and Date of Birth:  Wilder Scott 50 y o  1965    Date of Visit: 04/15/21    SUBJECTIVE:   Chloe Herrera was seen for follow-up of bipolar disorder and medication check  Pt overall at her baseline  Taking Lamictal 100 mg Wellbutrin  in the morning  Tells me that things are "about the same "  She does note some improvement with her motivation since being on the Wellbutrin  Denies any significant depressive episodes  Or crying spells  No manic episodes  Sleep is fair  Appetite is adequate  Denies any change in sleep or appetite with the medications  Denies any rash with lamotrigine  Overall tolerating well  Unfortunately her situation with her daughter has improved  She continues to work full-time and then help care for her grandchildren  Grandchildren are 4 1/2 and 332  Years old  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 for herself  physically reports she is feeling well and no new medications or new medical issues noted  She denies suicidal ideation, intent or plan at present, has no suicidal ideation, intent or plan at present  She denies any auditory hallucinations and visual hallucinations, denies any other delusional thinking, denies any delusional thinking  She denies any side effects from medications    HPI ROS Appetite Changes and Sleep: normal appetite, normal sleep    Review Of Systems:      Constitutional Negative   ENT Negative   Cardiovascular Negative   Respiratory Negative   Gastrointestinal Negative   Genitourinary Negative   Musculoskeletal Negative   Integumentary Negative   Neurological Negative   Endocrine Negative   Other Symptoms Negative and None       Laboratory Results: No results found for this or any previous visit      Substance Abuse History:    Social History     Substance and Sexual Activity   Drug Use Not Currently       Family Psychiatric History:     No family history on file  The following portions of the patient's history were reviewed and updated as appropriate: past family history, past medical history, past social history, past surgical history and problem list     Social History     Socioeconomic History    Marital status:      Spouse name: Not on file    Number of children: Not on file    Years of education: Not on file    Highest education level: Not on file   Occupational History    Not on file   Social Needs    Financial resource strain: Not on file    Food insecurity     Worry: Not on file     Inability: Not on file    Transportation needs     Medical: Not on file     Non-medical: Not on file   Tobacco Use    Smoking status: Current Every Day Smoker     Packs/day: 0 50    Smokeless tobacco: Never Used   Substance and Sexual Activity    Alcohol use:  Yes    Drug use: Not Currently    Sexual activity: Not on file   Lifestyle    Physical activity     Days per week: Not on file     Minutes per session: Not on file    Stress: Not on file   Relationships    Social connections     Talks on phone: Not on file     Gets together: Not on file     Attends Synagogue service: Not on file     Active member of club or organization: Not on file     Attends meetings of clubs or organizations: Not on file     Relationship status: Not on file    Intimate partner violence     Fear of current or ex partner: Not on file     Emotionally abused: Not on file     Physically abused: Not on file     Forced sexual activity: Not on file   Other Topics Concern    Not on file   Social History Narrative    Not on file     Social History     Social History Narrative    Not on file        Social History     Tobacco History     Smoking Status  Current Every Day Smoker Smoking Frequency  0 5 packs/day    Smokeless Tobacco Use  Never Used          Alcohol History     Alcohol Use Status  Yes          Drug Use     Drug Use Status  Not Currently          Sexual Activity     Sexually Active  Not Asked          Activities of Daily Living    Not Asked                     OBJECTIVE:     Mental Status Evaluation:    Appearance age appropriate, casually dressed   Behavior  calm, cooperative   Speech normal volume, normal pitch   Mood euthymic   Affect  constricted   Thought Processes logical   Associations intact associations   Thought Content normal   Perceptual Disturbances: none   Abnormal Thoughts  Risk Potential Suicidal ideation - None  Homicidal ideation - None  Potential for aggression - No   Orientation oriented to person, place, time/date and situation   Memory recent and remote memory grossly intact   Cosciousness alert and awake   Attention Span attention span and concentration are age appropriate   Intellect  not formally assessed   Insight age appropriate    Judgement good    Muscle Strength and  Gait  steady gait   Language no difficulty naming common objects   Fund of Knowledge displays adequate knowledge of current events   Pain none   Pain Scale 0       Assessment/Plan:       Diagnoses and all orders for this visit:    Bipolar 1 disorder (HCC)  -     lamoTRIgine (LaMICtal) 100 mg tablet; 1 po qd  -     buPROPion (WELLBUTRIN XL) 300 mg 24 hr tablet; Take 1 tablet (300 mg total) by mouth every morning    Anxiety          Treatment Recommendations/Precautions:   continue Wellbutrin  mg by    continue Lamotrigine 100 mg daily  Will follow-up in three months she will call sooner if any questions or concerns    Continue current medications    Risks/Benefits      Risks, Benefits And Possible Side Effects Of Medications:    Risks, benefits, and possible side effects of medications explained to patient and patient verbalizes understanding and agreement for treatment      Controlled Medication Discussion:     Not applicable    Psychotherapy Provided:     Individual psychotherapy provided: No

## 2021-07-15 ENCOUNTER — OFFICE VISIT (OUTPATIENT)
Dept: PSYCHIATRY | Facility: CLINIC | Age: 56
End: 2021-07-15
Payer: COMMERCIAL

## 2021-07-15 DIAGNOSIS — F31.9 BIPOLAR 1 DISORDER, DEPRESSED (HCC): Primary | ICD-10-CM

## 2021-07-15 DIAGNOSIS — F41.9 ANXIETY: ICD-10-CM

## 2021-07-15 DIAGNOSIS — F31.9 BIPOLAR 1 DISORDER (HCC): ICD-10-CM

## 2021-07-15 PROCEDURE — 99214 OFFICE O/P EST MOD 30 MIN: CPT | Performed by: PHYSICIAN ASSISTANT

## 2021-07-15 RX ORDER — LAMOTRIGINE 100 MG/1
TABLET ORAL
Qty: 30 TABLET | Refills: 3 | Status: SHIPPED | OUTPATIENT
Start: 2021-07-15

## 2021-07-15 RX ORDER — BUPROPION HYDROCHLORIDE 450 MG/1
450 TABLET, FILM COATED, EXTENDED RELEASE ORAL DAILY
Qty: 30 TABLET | Refills: 3 | Status: SHIPPED | OUTPATIENT
Start: 2021-07-15

## 2021-07-15 NOTE — PSYCH
PROGRESS NOTE        [facility]      Name and Date of Birth:  Maynor Dickinson 49 y o  1965    Date of Visit: 07/15/21    SUBJECTIVE:  Carolina Miller was seen for follow-up of bipolar disorder and medication check  Has been compliant with taking her medications including lamotrigine and Wellbutrin  States that initially she felt a benefit with medication as far as helping her motivation and mood  Reports that the past several weeks though she has been feeling less motivated and less interested  Reports that her focus and concentration has been okay at work  Outside of work though she is responsible for often caring for her grandchildren that are three and 11years old  Struggles with her daughter have not changed  She is sleeping and eating adequately  Has chronic gastroparesis so eat small amounts but maintaining her weight  Chronic back  Pain which she states that she has "gotten use to " not taking any other medication and no other new medical issues noted  She denies suicidal ideation, intent or plan at present, has no suicidal ideation, intent or plan at present  She denies any auditory hallucinations and visual hallucinations, denies any other delusional thinking, denies any delusional thinking  She denies any side effects from medications    HPI ROS Appetite Changes and Sleep: normal appetite, normal sleep    Review Of Systems:      Constitutional Negative   ENT Negative   Cardiovascular Negative   Respiratory Negative   Gastrointestinal Negative   Genitourinary Negative   Musculoskeletal   Chronic back pain   Integumentary Negative   Neurological Negative   Endocrine Negative   Other Symptoms Negative and None       Laboratory Results: No results found for this or any previous visit      Substance Abuse History:    Social History     Substance and Sexual Activity   Drug Use Not Currently       Family Psychiatric History:     No family history on file  The following portions of the patient's history were reviewed and updated as appropriate: past family history, past medical history, past social history, past surgical history and problem list     Social History     Socioeconomic History    Marital status:      Spouse name: Not on file    Number of children: Not on file    Years of education: Not on file    Highest education level: Not on file   Occupational History    Not on file   Tobacco Use    Smoking status: Current Every Day Smoker     Packs/day: 0 50    Smokeless tobacco: Never Used   Vaping Use    Vaping Use: Never used   Substance and Sexual Activity    Alcohol use: Yes    Drug use: Not Currently    Sexual activity: Not on file   Other Topics Concern    Not on file   Social History Narrative    Not on file     Social Determinants of Health     Financial Resource Strain:     Difficulty of Paying Living Expenses:    Food Insecurity:     Worried About Running Out of Food in the Last Year:     [address] in the Last Year:    Transportation Needs:     Lack of Transportation (Medical):      Lack of Transportation (Non-Medical):    Physical Activity:     Days of Exercise per Week:     Minutes of Exercise per Session:    Stress:     Feeling of Stress :    Social Connections:     Frequency of Communication with Friends and Family:     Frequency of Social Gatherings with Friends and Family:     Attends Taoist Services:     Active Member of Clubs or Organizations:     Attends Club or Organization Meetings:     Marital Status:    Intimate Partner Violence:     Fear of Current or Ex-Partner:     Emotionally Abused:     Physically Abused:     Sexually Abused:      Social History     Social History Narrative    Not on file        Social History     Tobacco History     Smoking Status  Current Every Day Smoker Smoking Frequency  0 5 packs/day    Smokeless Tobacco Use  Never Used          Alcohol History     Alcohol Use Status  Yes          Drug Use     Drug Use Status  Not Currently          Sexual Activity     Sexually Active  Not Asked          Activities of Daily Living    Not Asked                     OBJECTIVE:     Mental Status Evaluation:    Appearance age appropriate, casually dressed   Behavior  calm, cooperative   Speech normal volume, normal pitch   Mood  dysthymic   Affect  dysthymic   Thought Processes logical   Associations intact associations   Thought Content normal   Perceptual Disturbances: none   Abnormal Thoughts  Risk Potential Suicidal ideation - None  Homicidal ideation - None  Potential for aggression - No   Orientation oriented to person, place, time/date and situation   Memory recent and remote memory grossly intact   Cosciousness alert and awake   Attention Span attention span and concentration are age appropriate   Intellect  not formally assessed   Insight age appropriate    Judgement good    Muscle Strength and  Gait Steady gait   Language no difficulty naming common objects   Fund of Knowledge displays adequate knowledge of current events   Pain  chronic back pain   Pain Scale  pain       Assessment/Plan:       Diagnoses and all orders for this visit:    Bipolar 1 disorder, depressed (HCC)  -     buPROPion (FORFIVO XL) 450 MG 24 hr tablet; Take 1 tablet (450 mg total) by mouth daily    Bipolar 1 disorder (HCC)  -     lamoTRIgine (LaMICtal) 100 mg tablet; 1 po qd    Anxiety  -     buPROPion (FORFIVO XL) 450 MG 24 hr tablet; Take 1 tablet (450 mg total) by mouth daily          Treatment Recommendations/Precautions: Wellbutrin  mg q a m    Will increase to 450 mg total in the morning   continue with Lamotrigine 100 mg daily, no side effect, no rash  Will follow-up in three months she will call sooner if any questions or concerns    Risks/Benefits      Risks, Benefits And Possible Side Effects Of Medications:    Risks, benefits, and possible side effects of medications explained to patient and patient verbalizes understanding and agreement for treatment      Controlled Medication Discussion:     Not applicable    Psychotherapy Provided:     Individual psychotherapy provided: No

## 2022-01-10 ENCOUNTER — OFFICE VISIT (OUTPATIENT)
Dept: URGENT CARE | Facility: CLINIC | Age: 57
End: 2022-01-10
Payer: COMMERCIAL

## 2022-01-10 VITALS — WEIGHT: 155 LBS | HEIGHT: 66 IN | BODY MASS INDEX: 24.91 KG/M2

## 2022-01-10 DIAGNOSIS — J06.9 BACTERIAL URI: Primary | ICD-10-CM

## 2022-01-10 DIAGNOSIS — B96.89 BACTERIAL URI: Primary | ICD-10-CM

## 2022-01-10 PROCEDURE — G0382 LEV 3 HOSP TYPE B ED VISIT: HCPCS | Performed by: NURSE PRACTITIONER

## 2022-01-10 RX ORDER — AZITHROMYCIN 250 MG/1
TABLET, FILM COATED ORAL
Qty: 6 TABLET | Refills: 0 | Status: SHIPPED | OUTPATIENT
Start: 2022-01-10 | End: 2022-01-14

## 2022-01-10 NOTE — PROGRESS NOTES
[facility]        NAME: Avtar Smart is a 49 y o  female  : 1965    MRN: [id number]  DATE: January 10, 2022  TIME: 11:39 AM    Assessment and Plan   Bacterial URI [J06 9, B96 89]  1  Bacterial URI  azithromycin (ZITHROMAX) 250 mg tablet     Start course of zithromax   Continue otc medication   F/u with pcp in 3-5 days      Patient Instructions     Follow up with PCP in 3-5 days  Proceed to  ER if symptoms worsen  Chief Complaint     Chief Complaint   Patient presents with    URI     Patient states she started on the 30 with congestion, headache, cough, sneezing, and sinus pressure         History of Present Illness   Avtar Smart presents to the clinic c/o    URI (Patient states she started on the 30 with congestion, headache, cough, sneezing, and sinus pressure)  Did at home covid tests that were negative   Has a 11and 1year old grandchildren that live with her  They also have URIs      Review of Systems   Review of Systems   All other systems reviewed and are negative  Current Medications     Long-Term Medications   Medication Sig Dispense Refill    buPROPion (FORFIVO XL) 450 MG 24 hr tablet Take 1 tablet (450 mg total) by mouth daily (Patient not taking: Reported on 1/10/2022 ) 30 tablet 3    lamoTRIgine (LaMICtal) 100 mg tablet 1 po qd (Patient not taking: Reported on 1/10/2022 ) 30 tablet 3       Current Allergies     Allergies as of 01/10/2022 - Reviewed 01/10/2022   Allergen Reaction Noted    Morphine  2012    Oxycodone Other (See Comments) 2010            The following portions of the patient's history were reviewed and updated as appropriate: allergies, current medications, past family history, past medical history, past social history, past surgical history and problem list     Objective   Ht 5' 6" (1 676 m)   Wt 70 3 kg (155 lb)   LMP  (LMP Unknown)   BMI 25 02 kg/m²        Physical Exam     Physical Exam  Vitals and nursing note reviewed  Constitutional:       Appearance: Normal appearance  She is well-developed  HENT:      Head: Normocephalic and atraumatic  Right Ear: Tympanic membrane, ear canal and external ear normal       Left Ear: Tympanic membrane, ear canal and external ear normal       Nose: Congestion present  Right Sinus: Frontal sinus tenderness present  Left Sinus: Frontal sinus tenderness present  Mouth/Throat:      Mouth: Mucous membranes are moist    Eyes:      General: Lids are normal       Extraocular Movements: Extraocular movements intact  Conjunctiva/sclera: Conjunctivae normal       Pupils: Pupils are equal, round, and reactive to light  Cardiovascular:      Rate and Rhythm: Normal rate and regular rhythm  Heart sounds: Normal heart sounds, S1 normal and S2 normal    Pulmonary:      Effort: Pulmonary effort is normal       Breath sounds: Normal breath sounds  Musculoskeletal:      Cervical back: Normal range of motion  Skin:     General: Skin is warm and dry  Neurological:      Mental Status: She is alert and oriented to person, place, and time  Psychiatric:         Speech: Speech normal          Behavior: Behavior normal  Behavior is cooperative  Thought Content:  Thought content normal          Judgment: Judgment normal

## 2022-01-10 NOTE — PATIENT INSTRUCTIONS
Cold Symptoms   AMBULATORY CARE:   Cold symptoms  include sneezing, dry throat, a stuffy nose, headache, watery eyes, and a cough  Your cough may be dry, or you may cough up mucus  You may also have muscle aches, joint pain, and tiredness  Rarely, you may have a fever  Cold symptoms occur from inflammation in your upper respiratory system caused by a virus  Most colds go away without treatment  Seek care immediately if:   · You have increased tiredness and weakness  · You are unable to eat  · Your heart is beating much faster than usual for you  · You see white spots in the back of your throat and your neck is swollen and sore to the touch  · You see pinpoint or larger reddish-purple dots on your skin  Contact your healthcare provider if:   · You have a fever higher than 102°F (38 9°C)  · You have new or worsening shortness of breath  · You have thick nasal drainage for more than 2 days  · Your symptoms do not improve or get worse within 5 days  · You have questions or concerns about your condition or care  Treatment for cold symptoms  may include NSAIDS to decrease muscle aches and fever  Cold medicines may also be given to decrease coughing, nasal stuffiness, sneezing, and a runny nose  Manage your cold symptoms: The following may help relieve cold symptoms, such as a dry throat and congestion:  · Gargle with mouthwash or warm salt water as directed  · Suck on throat lozenges or hard candy  · Use a cold or warm vaporizer or humidifier to ease your breathing  · Rest for at least 2 days and then as needed to decrease tiredness and weakness  · Use petroleum based jelly around your nostrils to decrease irritation from blowing your nose  · Drink plenty of liquids  Liquids will help thin and loosen thick mucus so you can cough it up  Liquids will also keep you hydrated   Ask your healthcare provider which liquids are best for you and how much to drink each day     Prevent the spread of germs  by washing your hands often  You can spread your cold germs to others for at least 3 days after your symptoms start  Do not share items, such as eating utensils  Cover your nose and mouth when you cough or sneeze using the crook of your elbow instead of your hands  Throw used tissues in the garbage  Do not smoke:  Smoking may worsen your symptoms and increase the length of time you feel sick  Talk with your healthcare provider if you need help to stop smoking  Follow up with your doctor as directed:  Write down your questions so you remember to ask them during your visits  © Copyright [employer] 2021 Information is for End User's use only and may not be sold, redistributed or otherwise used for commercial purposes  All illustrations and images included in CareNotes® are the copyrighted property of [employer]  or Roselyn Rahman  The above information is an  only  It is not intended as medical advice for individual conditions or treatments  Talk to your doctor, nurse or pharmacist before following any medical regimen to see if it is safe and effective for you

## 2022-04-08 ENCOUNTER — APPOINTMENT (OUTPATIENT)
Dept: RADIOLOGY | Facility: CLINIC | Age: 57
End: 2022-04-08
Payer: COMMERCIAL

## 2022-04-08 ENCOUNTER — OFFICE VISIT (OUTPATIENT)
Dept: URGENT CARE | Facility: CLINIC | Age: 57
End: 2022-04-08
Payer: COMMERCIAL

## 2022-04-08 VITALS
HEART RATE: 104 BPM | TEMPERATURE: 98.8 F | DIASTOLIC BLOOD PRESSURE: 80 MMHG | OXYGEN SATURATION: 98 % | WEIGHT: 155 LBS | RESPIRATION RATE: 20 BRPM | HEIGHT: 66 IN | SYSTOLIC BLOOD PRESSURE: 110 MMHG | BODY MASS INDEX: 24.91 KG/M2

## 2022-04-08 DIAGNOSIS — S61.412A LACERATION OF LEFT HAND WITHOUT FOREIGN BODY, INITIAL ENCOUNTER: ICD-10-CM

## 2022-04-08 DIAGNOSIS — S67.22XA CRUSHING INJURY OF LEFT HAND, INITIAL ENCOUNTER: Primary | ICD-10-CM

## 2022-04-08 DIAGNOSIS — S69.92XA HAND INJURY, LEFT, INITIAL ENCOUNTER: ICD-10-CM

## 2022-04-08 PROCEDURE — 90715 TDAP VACCINE 7 YRS/> IM: CPT

## 2022-04-08 PROCEDURE — 73130 X-RAY EXAM OF HAND: CPT

## 2022-04-08 PROCEDURE — 90471 IMMUNIZATION ADMIN: CPT | Performed by: PHYSICIAN ASSISTANT

## 2022-04-08 PROCEDURE — 96372 THER/PROPH/DIAG INJ SC/IM: CPT | Performed by: PHYSICIAN ASSISTANT

## 2022-04-08 PROCEDURE — 99213 OFFICE O/P EST LOW 20 MIN: CPT | Performed by: PHYSICIAN ASSISTANT

## 2022-04-08 RX ORDER — ACETAMINOPHEN AND CODEINE PHOSPHATE 300; 30 MG/1; MG/1
1 TABLET ORAL EVERY 4 HOURS PRN
Qty: 30 TABLET | Refills: 0 | Status: SHIPPED | OUTPATIENT
Start: 2022-04-08

## 2022-04-08 RX ORDER — KETOROLAC TROMETHAMINE 30 MG/ML
30 INJECTION, SOLUTION INTRAMUSCULAR; INTRAVENOUS ONCE
Status: COMPLETED | OUTPATIENT
Start: 2022-04-08 | End: 2022-04-08

## 2022-04-08 RX ADMIN — KETOROLAC TROMETHAMINE 30 MG: 30 INJECTION, SOLUTION INTRAMUSCULAR; INTRAVENOUS at 11:41

## 2022-04-08 NOTE — PROGRESS NOTES
[facility]    NAME: Tu Yancey is a 49 y o  female  : 1965    MRN: [id number]  DATE: 2022  TIME: 11:42 AM    Assessment and Plan   Crushing injury of left hand, initial encounter [S67 22XA]  1  Crushing injury of left hand, initial encounter  XR hand 3+ vw left    ketorolac (TORADOL) injection 30 mg    acetaminophen-codeine (TYLENOL #3) 300-30 mg per tablet    Compression bandages   2  Laceration of left hand without foreign body, initial encounter  TDAP VACCINE GREATER THAN OR EQUAL TO 8YO IM    ketorolac (TORADOL) injection 30 mg     X-ray left hand:  No acute bony changes  Await radiologist's final test results  Nurse administered Tdap vaccine  Nurse administered Toradol injection  Nurse cleansed wound and covered with small amount of topical antibiotic ointment and dressing and then applied compression bandage  Prescriber consulted [place] website to check for red flags for prescription of opioid  No red flags  Patient Instructions   Patient Instructions   Your given a shot of Toradol which is an anti-inflammatory pain medication  After 4-6 hours you may start taking ibuprofen 2-3 tablets 3 to 4 times a day with food  Your given a prescription of Tylenol with codeine  May add this to the ibuprofen to help fight pain  Home use only as may cause drowsiness  Do not mix with any other potential sedating products  Rest hand  Cold compresses 10-15 minutes every 1-2 hours while awake  Compression for comfort and protection  Do not recommend wearing Ace wrap to sleep  Elevate to help control pain  Gently cleanse wound daily and apply small amount of topical antibiotic ointment  Call your primary care provider to arrange follow-up evaluation in the next 3-5 days  If severe worsening of pain, proceed to emergency room for immediate further evaluation        Chief Complaint     Chief Complaint   Patient presents with    Hand Injury     [address] states she was changing the tire on the car, the car moved and it pinned her hand between the tire and the wheel well  History of Present Illness   Nicky Aguero presents to the clinic c/o  51-year-old left-hand dominant comes in with acute left hand thumb pain after her car slipped a ANNIE while she was putting on new tire  Car came down and caught her left hand between rim and tire causing injury  Terrible pain base of thumb  Some disruption of skin  Unknown last tetanus  Hand Injury         Review of Systems   Review of Systems   Constitutional: Positive for activity change and appetite change  Negative for chills and fever  Respiratory: Negative  Cardiovascular: Negative  Musculoskeletal: Positive for arthralgias, joint swelling and myalgias  Skin: Positive for color change and wound  Current Medications     Long-Term Medications   Medication Sig Dispense Refill    buPROPion (FORFIVO XL) 450 MG 24 hr tablet Take 1 tablet (450 mg total) by mouth daily (Patient not taking: Reported on 1/10/2022 ) 30 tablet 3    lamoTRIgine (LaMICtal) 100 mg tablet 1 po qd (Patient not taking: Reported on 1/10/2022 ) 30 tablet 3       Current Allergies     Allergies as of 2022 - Reviewed 2022   Allergen Reaction Noted    Morphine  2012    Oxycodone Other (See Comments) 2010          The following portions of the patient's history were reviewed and updated as appropriate: allergies, current medications, past family history, past medical history, past social history, past surgical history and problem list   Past Medical History:   Diagnosis Date    Asthma     Bipolar 1 disorder ([address] )     Gastroparesis      Past Surgical History:   Procedure Laterality Date    BREAST SURGERY       SECTION      FISTULA REPAIR       History reviewed  No pertinent family history      Objective   /80   Pulse 104   Temp 98 8 °F (37 1 °C)   Resp 20   Ht 5' 6" (1 676 m)   Wt 70 3 kg (155 lb)   SpO2 98%   BMI 25 02 kg/m²   No LMP recorded  Patient is perimenopausal        Physical Exam     Physical Exam  Vitals and nursing note reviewed  Constitutional:       Appearance: She is well-developed  She is not ill-appearing, toxic-appearing or diaphoretic  Comments: Holding left hand in guarded position with bag of ice  Cardiovascular:      Rate and Rhythm: Normal rate  Pulmonary:      Effort: Pulmonary effort is normal  No respiratory distress  Musculoskeletal:         General: Swelling, tenderness and signs of injury present  No deformity  Comments: Pain worst at MCP joint left thumb  No gross bony deformity  Acute swelling noted  Able to move fingers without difficulty  Decreased range of motion left thumb  Good range of motion left wrist   Negative snuffbox TTP  Skin:     General: Skin is warm and dry  Findings: Bruising present  Comments: Shallow laceration web space between left thumb and index  No active bleeding  Contusion noted left thumb region and left upper hand  Neurological:      Mental Status: She is alert and oriented to person, place, and time        Comments: Neurovascular intact distally to left thumb and left index finger   Psychiatric:         Mood and Affect: Mood normal          Behavior: Behavior normal

## 2022-04-08 NOTE — PATIENT INSTRUCTIONS
Your given a shot of Toradol which is an anti-inflammatory pain medication  After 4-6 hours you may start taking ibuprofen 2-3 tablets 3 to 4 times a day with food  Your given a prescription of Tylenol with codeine  May add this to the ibuprofen to help fight pain  Home use only as may cause drowsiness  Do not mix with any other potential sedating products  Rest hand  Cold compresses 10-15 minutes every 1-2 hours while awake  Compression for comfort and protection  Do not recommend wearing Ace wrap to sleep  Elevate to help control pain  Gently cleanse wound daily and apply small amount of topical antibiotic ointment  Call your primary care provider to arrange follow-up evaluation in the next 3-5 days  If severe worsening of pain, proceed to emergency room for immediate further evaluation

## 2022-06-08 ENCOUNTER — OFFICE VISIT (OUTPATIENT)
Dept: URGENT CARE | Facility: CLINIC | Age: 57
End: 2022-06-08
Payer: COMMERCIAL

## 2022-06-08 VITALS
SYSTOLIC BLOOD PRESSURE: 126 MMHG | RESPIRATION RATE: 18 BRPM | BODY MASS INDEX: 25.02 KG/M2 | DIASTOLIC BLOOD PRESSURE: 74 MMHG | OXYGEN SATURATION: 98 % | WEIGHT: 155 LBS | HEART RATE: 90 BPM | TEMPERATURE: 97.3 F

## 2022-06-08 DIAGNOSIS — L29.9 PRURITUS: Primary | ICD-10-CM

## 2022-06-08 DIAGNOSIS — R10.13 EPIGASTRIC PAIN: ICD-10-CM

## 2022-06-08 PROCEDURE — 99213 OFFICE O/P EST LOW 20 MIN: CPT | Performed by: PHYSICIAN ASSISTANT

## 2022-06-08 RX ORDER — PREDNISONE 10 MG/1
TABLET ORAL
Qty: 21 TABLET | Refills: 0 | Status: SHIPPED | OUTPATIENT
Start: 2022-06-08

## 2022-06-08 RX ORDER — DULOXETIN HYDROCHLORIDE 30 MG/1
CAPSULE, DELAYED RELEASE ORAL
COMMUNITY

## 2022-06-08 RX ORDER — FAMOTIDINE 20 MG/1
20 TABLET, FILM COATED ORAL 2 TIMES DAILY
Qty: 20 TABLET | Refills: 0 | Status: SHIPPED | OUTPATIENT
Start: 2022-06-08

## 2022-06-08 RX ORDER — FLUOXETINE 10 MG/1
CAPSULE ORAL
COMMUNITY

## 2022-06-08 NOTE — PATIENT INSTRUCTIONS
Take prednisone as instructed  While on prednisone do not take ibuprofen or ibuprofen like products  Take Pepcid as a histamine blocker  This may also help decrease itch  Call your primary care provider soon as possible to arrange follow-up for soon as possible  If significant worsening of symptoms, please proceed to emergency room for immediate further evaluation  Evaluation may include thinks that cannot be done in the care now setting

## 2022-06-08 NOTE — PROGRESS NOTES
[facility]    NAME: Liz Schaeffer is a 49 y o  female  : 1965    MRN: [id number]  DATE: 2022  TIME: 2:26 PM    Assessment and Plan   Pruritus [L29 9]  1  Pruritus  predniSONE 10 mg tablet    famotidine (PEPCID) 20 mg tablet   2  Epigastric pain  famotidine (PEPCID) 20 mg tablet       Patient Instructions   Patient Instructions   Take prednisone as instructed  While on prednisone do not take ibuprofen or ibuprofen like products  Take Pepcid as a histamine blocker  This may also help decrease itch  Call your primary care provider soon as possible to arrange follow-up for soon as possible  If significant worsening of symptoms, please proceed to emergency room for immediate further evaluation  Evaluation may include thinks that cannot be done in the care now setting  Chief Complaint     Chief Complaint   Patient presents with    Itching     Patient c/o pruritus all over her body x 3 days  She noted this morning chest pain, L side flank pain , and headache  History of Present Illness   Liz Schaeffer presents to the clinic c/o  82-year-old female that comes in with some burning discomfort on her head feet hands that started Saturday she did have some nausea this morning with headache  That is resolved  No current chest pain or shortness of breath  Abdominal pain has resolved  She has tried Benadryl, Motrin and Tums  She did not contact her primary care doctor and says that he only works 3 days a week and he is getting ready to retire  She has history of similar burning itching discomfort about 25 years ago  It was treated with prednisone and she thought that that cause some GI distress  She also has had evaluation of her gallbladder and the past and during that evaluation no gallstones were found but incidental cyst on liver  Review of Systems   Review of Systems   Constitutional: Positive for activity change and appetite change   Negative for chills, diaphoresis, fatigue and fever  HENT: Negative  Respiratory: Negative  Cardiovascular: Negative  Gastrointestinal: Positive for abdominal pain and nausea  Negative for constipation, diarrhea, rectal pain and vomiting  Skin:        Itching and burning of skin   Neurological: Positive for headaches  Current Medications     Long-Term Medications   Medication Sig Dispense Refill    famotidine (PEPCID) 20 mg tablet Take 1 tablet (20 mg total) by mouth 2 (two) times a day 20 tablet 0    buPROPion (FORFIVO XL) 450 MG 24 hr tablet Take 1 tablet (450 mg total) by mouth daily (Patient not taking: Reported on 1/10/2022 ) 30 tablet 3    DULoxetine (CYMBALTA) 30 mg delayed release capsule duloxetine 30 mg capsule,delayed release      FLUoxetine (PROzac) 10 mg capsule fluoxetine 10 mg capsule      lamoTRIgine (LaMICtal) 100 mg tablet 1 po qd (Patient not taking: Reported on 1/10/2022 ) 30 tablet 3       Current Allergies     Allergies as of 2022 - Reviewed 2022   Allergen Reaction Noted    Morphine  2012    Oxycodone Other (See Comments) 2010          The following portions of the patient's history were reviewed and updated as appropriate: allergies, current medications, past family history, past medical history, past social history, past surgical history and problem list   Past Medical History:   Diagnosis Date    Asthma     Bipolar 1 disorder ([address] )     Gastroparesis      Past Surgical History:   Procedure Laterality Date    BREAST SURGERY       SECTION      FISTULA REPAIR       History reviewed  No pertinent family history  Objective   /74   Pulse 90   Temp (!) 97 3 °F (36 3 °C)   Resp 18   Wt 70 3 kg (155 lb)   SpO2 98%   BMI 25 02 kg/m²   No LMP recorded  Physical Exam     Physical Exam  Vitals and nursing note reviewed  Constitutional:       General: She is not in acute distress  Appearance: Normal appearance   She is not ill-appearing, toxic-appearing or diaphoretic  HENT:      Head: Normocephalic and atraumatic  Mouth/Throat:      Mouth: Mucous membranes are moist    Eyes:      General: No scleral icterus  Right eye: No discharge  Left eye: No discharge  Extraocular Movements: Extraocular movements intact  Conjunctiva/sclera: Conjunctivae normal       Pupils: Pupils are equal, round, and reactive to light  Cardiovascular:      Rate and Rhythm: Normal rate and regular rhythm  Heart sounds: Normal heart sounds  No murmur heard  No friction rub  No gallop  Pulmonary:      Effort: Pulmonary effort is normal  No respiratory distress  Breath sounds: Normal breath sounds  No stridor  No wheezing, rhonchi or rales  Abdominal:      General: Abdomen is flat  Bowel sounds are normal  There is no distension  Tenderness: There is no abdominal tenderness  There is no right CVA tenderness, left CVA tenderness, guarding or rebound  Negative signs include Verdugo's sign, Rovsing's sign and McBurney's sign  Musculoskeletal:      Cervical back: Normal range of motion and neck supple  No rigidity or tenderness  Right lower leg: No edema  Left lower leg: No edema  Lymphadenopathy:      Cervical: No cervical adenopathy  Skin:     General: Skin is warm and dry  Coloration: Skin is not jaundiced or pale  Findings: No bruising, erythema, lesion or rash  Neurological:      Mental Status: She is alert     Psychiatric:         Mood and Affect: Mood normal          Behavior: Behavior normal

## 2023-04-07 ENCOUNTER — OFFICE VISIT (OUTPATIENT)
Dept: URGENT CARE | Facility: CLINIC | Age: 58
End: 2023-04-07

## 2023-04-07 VITALS
OXYGEN SATURATION: 98 % | DIASTOLIC BLOOD PRESSURE: 74 MMHG | HEART RATE: 83 BPM | TEMPERATURE: 96 F | SYSTOLIC BLOOD PRESSURE: 110 MMHG | RESPIRATION RATE: 18 BRPM

## 2023-04-07 DIAGNOSIS — L98.9 SKIN LESION OF BACK: ICD-10-CM

## 2023-04-07 DIAGNOSIS — R11.0 NAUSEA: Primary | ICD-10-CM

## 2023-04-07 RX ORDER — ONDANSETRON 4 MG/1
4 TABLET, ORALLY DISINTEGRATING ORAL EVERY 6 HOURS PRN
Qty: 20 TABLET | Refills: 0 | Status: SHIPPED | OUTPATIENT
Start: 2023-04-07

## 2023-04-07 RX ORDER — FLUOXETINE HYDROCHLORIDE 20 MG/1
20 CAPSULE ORAL 2 TIMES DAILY
COMMUNITY
Start: 2023-03-14

## 2023-04-07 NOTE — PROGRESS NOTES
[facility]    NAME: Brittany Gentile is a 56 y o  female  : 1965    MRN: [id number]  DATE: 2023  TIME: 10:49 AM    Assessment and Plan   Nausea [R11 0]  1  Nausea  ondansetron (ZOFRAN-ODT) 4 mg disintegrating tablet      2  Skin lesion of back            Patient Instructions   Patient Instructions   1  Clear liquids for 12-24 hours     2  Then may advance to bland diet (ie  BRAT - bananas, applesauce, toast) as tolerated  3  Recommend avoiding any milk products, spicy, greasy foods and citrus products over the next 1-2 weeks  Advance diet as tolerated  4  Transmission precautions advised  5  If symptoms are not resolving over the next 2-3 days, seek further evaluation by your PCP  6  If you symptoms worsen and you are unable to keep any food or liquid down or develop significant abdominal pain, proceed to ER for further evaluation and treatment  7   Transmission precautions advised  If potential viral or bacterial infection, may be transmitted to other people  Recommend good handwashing after using toilet and keeping any shared bathrooms / sinks / handles well cleaned  Follow up with your PCP or derm with regard to lesion R  Upper back  Chief Complaint     Chief Complaint   Patient presents with   • Nausea     Pt C/O nausea and vomited on Wednesday with continued nausea  History of Present Illness   Brittany Gentile presents to the clinic c/o  53-year-old female comes in with complaint of nausea and vomiting  Started: Tuesday during day noted some nausea  Associated signs and symptoms: Got home from work and nausea continued and felt like she was going to vomit  Diaphoretic but did not vomit until the next morning  She did last vomit around 6 PM last evening but just a small amount  Had diarrhea last week  Stools are somewhat back to normal but she has IBS  Modifying factors: Limiting diet    Known Exposures: 2 grandkids had been sick last week with vomiting  Recent travel: Denies  Patient also mentions lesion on posterior right upper back shoulder area that she has had for several months  Itches  Daughter and patient have tried to peel off  Persisting  Review of Systems   Review of Systems   Constitutional: Positive for activity change, appetite change and fatigue  Negative for chills, diaphoresis and fever  Respiratory: Negative  Cardiovascular: Negative  Gastrointestinal: Positive for abdominal distention, abdominal pain, nausea and vomiting  Negative for anal bleeding, blood in stool, constipation and diarrhea         Current Medications     Long-Term Medications   Medication Sig Dispense Refill   • DULoxetine (CYMBALTA) 30 mg delayed release capsule duloxetine 30 mg capsule,delayed release     • famotidine (PEPCID) 20 mg tablet Take 1 tablet (20 mg total) by mouth 2 (two) times a day 20 tablet 0   • FLUoxetine (PROzac) 10 mg capsule fluoxetine 10 mg capsule     • FLUoxetine (PROzac) 20 mg capsule Take 20 mg by mouth 2 (two) times a day     • ondansetron (ZOFRAN-ODT) 4 mg disintegrating tablet Take 1 tablet (4 mg total) by mouth every 6 (six) hours as needed for nausea or vomiting 20 tablet 0   • buPROPion (FORFIVO XL) 450 MG 24 hr tablet Take 1 tablet (450 mg total) by mouth daily (Patient not taking: Reported on 1/10/2022 ) 30 tablet 3   • lamoTRIgine (LaMICtal) 100 mg tablet 1 po qd (Patient not taking: Reported on 1/10/2022) 30 tablet 3       Current Allergies     Allergies as of 04/07/2023 - Reviewed 04/07/2023   Allergen Reaction Noted   • Morphine  08/14/2012   • Oxycodone Other (See Comments) 11/03/2010          The following portions of the patient's history were reviewed and updated as appropriate: allergies, current medications, past family history, past medical history, past social history, past surgical history and problem list   Past Medical History:   Diagnosis Date   • Asthma    • Bipolar 1 disorder ([address] ) • Gastroparesis      Past Surgical History:   Procedure Laterality Date   • BREAST SURGERY     •  SECTION     • FISTULA REPAIR       History reviewed  No pertinent family history  Objective   /74 (BP Location: Left arm, Patient Position: Sitting, Cuff Size: Standard)   Pulse 83   Temp (!) 96 °F (35 6 °C) (Tympanic)   Resp 18   LMP  (LMP Unknown)   SpO2 98%   No LMP recorded (lmp unknown)  Patient is postmenopausal        Physical Exam     Physical Exam  Vitals and nursing note reviewed  Constitutional:       General: She is not in acute distress  Appearance: She is well-developed  She is ill-appearing  She is not toxic-appearing or diaphoretic  HENT:      Mouth/Throat:      Mouth: Mucous membranes are moist    Eyes:      General: No scleral icterus  Extraocular Movements: Extraocular movements intact  Conjunctiva/sclera: Conjunctivae normal       Pupils: Pupils are equal, round, and reactive to light  Comments: No conjunctival pallor or scleral icterus   Cardiovascular:      Rate and Rhythm: Normal rate and regular rhythm  Heart sounds: Normal heart sounds  No murmur heard  No friction rub  No gallop  Pulmonary:      Effort: Pulmonary effort is normal  No respiratory distress  Breath sounds: Normal breath sounds  No stridor  No wheezing, rhonchi or rales  Abdominal:      General: There is distension  Palpations: Abdomen is soft  There is no hepatomegaly, splenomegaly or mass  Tenderness: There is generalized abdominal tenderness  There is no guarding or rebound  Hernia: No hernia is present  Comments: Bowel sounds increased throughout w/o tinkling or rushes  Skin:     General: Skin is warm and dry  Coloration: Skin is not jaundiced or pale  Comments: Excoriated crusted papule right upper back posterior shoulder approximately 4 mm  Appearance of seborrheic keratosis with irritation     Neurological:      General: No focal deficit present  Mental Status: She is alert and oriented to person, place, and time     Psychiatric:         Mood and Affect: Mood normal          Behavior: Behavior normal

## 2023-04-07 NOTE — LETTER
April 7, 2023     Patient: Em Tom   YOB: 1965   Date of Visit: 4/7/2023       To Whom It May Concern:    Patient comes in for acute medical ailment  She reports that she has been off work sick since Wednesday  May return to work on Monday         Sincerely,        Luke Cross PA-C    CC: No Recipients

## 2023-04-07 NOTE — PATIENT INSTRUCTIONS
1  Clear liquids for 12-24 hours     2  Then may advance to bland diet (ie  BRAT - bananas, applesauce, toast) as tolerated  3  Recommend avoiding any milk products, spicy, greasy foods and citrus products over the next 1-2 weeks  Advance diet as tolerated  4  Transmission precautions advised  5  If symptoms are not resolving over the next 2-3 days, seek further evaluation by your PCP  6  If you symptoms worsen and you are unable to keep any food or liquid down or develop significant abdominal pain, proceed to ER for further evaluation and treatment  7   Transmission precautions advised  If potential viral or bacterial infection, may be transmitted to other people  Recommend good handwashing after using toilet and keeping any shared bathrooms / sinks / handles well cleaned  Follow up with your PCP or derm with regard to lesion R  Upper back